# Patient Record
Sex: MALE | Race: WHITE | URBAN - METROPOLITAN AREA
[De-identification: names, ages, dates, MRNs, and addresses within clinical notes are randomized per-mention and may not be internally consistent; named-entity substitution may affect disease eponyms.]

---

## 2017-09-11 ENCOUNTER — OUTPATIENT (OUTPATIENT)
Dept: OUTPATIENT SERVICES | Facility: HOSPITAL | Age: 62
LOS: 1 days | Discharge: HOME | End: 2017-09-11

## 2017-09-11 DIAGNOSIS — R31.9 HEMATURIA, UNSPECIFIED: ICD-10-CM

## 2017-09-18 ENCOUNTER — OUTPATIENT (OUTPATIENT)
Dept: OUTPATIENT SERVICES | Facility: HOSPITAL | Age: 62
LOS: 1 days | Discharge: HOME | End: 2017-09-18

## 2017-09-18 DIAGNOSIS — C61 MALIGNANT NEOPLASM OF PROSTATE: ICD-10-CM

## 2017-11-03 ENCOUNTER — OUTPATIENT (OUTPATIENT)
Dept: OUTPATIENT SERVICES | Facility: HOSPITAL | Age: 62
LOS: 1 days | Discharge: HOME | End: 2017-11-03

## 2017-11-08 DIAGNOSIS — Z12.11 ENCOUNTER FOR SCREENING FOR MALIGNANT NEOPLASM OF COLON: ICD-10-CM

## 2017-11-08 DIAGNOSIS — R19.7 DIARRHEA, UNSPECIFIED: ICD-10-CM

## 2017-11-08 DIAGNOSIS — K21.0 GASTRO-ESOPHAGEAL REFLUX DISEASE WITH ESOPHAGITIS: ICD-10-CM

## 2017-11-08 DIAGNOSIS — B96.81 HELICOBACTER PYLORI [H. PYLORI] AS THE CAUSE OF DISEASES CLASSIFIED ELSEWHERE: ICD-10-CM

## 2017-11-08 DIAGNOSIS — M10.9 GOUT, UNSPECIFIED: ICD-10-CM

## 2017-11-08 DIAGNOSIS — N40.0 BENIGN PROSTATIC HYPERPLASIA WITHOUT LOWER URINARY TRACT SYMPTOMS: ICD-10-CM

## 2017-11-08 DIAGNOSIS — E66.9 OBESITY, UNSPECIFIED: ICD-10-CM

## 2017-11-08 DIAGNOSIS — K31.89 OTHER DISEASES OF STOMACH AND DUODENUM: ICD-10-CM

## 2017-11-08 DIAGNOSIS — K64.8 OTHER HEMORRHOIDS: ICD-10-CM

## 2017-11-08 DIAGNOSIS — K64.4 RESIDUAL HEMORRHOIDAL SKIN TAGS: ICD-10-CM

## 2017-11-08 DIAGNOSIS — K29.80 DUODENITIS WITHOUT BLEEDING: ICD-10-CM

## 2017-11-08 DIAGNOSIS — E78.00 PURE HYPERCHOLESTEROLEMIA, UNSPECIFIED: ICD-10-CM

## 2017-11-08 DIAGNOSIS — K25.4 CHRONIC OR UNSPECIFIED GASTRIC ULCER WITH HEMORRHAGE: ICD-10-CM

## 2018-01-02 PROBLEM — Z00.00 ENCOUNTER FOR PREVENTIVE HEALTH EXAMINATION: Status: ACTIVE | Noted: 2018-01-02

## 2018-01-26 ENCOUNTER — OUTPATIENT (OUTPATIENT)
Dept: OUTPATIENT SERVICES | Facility: HOSPITAL | Age: 63
LOS: 1 days | Discharge: HOME | End: 2018-01-26

## 2018-01-31 DIAGNOSIS — E78.00 PURE HYPERCHOLESTEROLEMIA, UNSPECIFIED: ICD-10-CM

## 2018-01-31 DIAGNOSIS — D13.1 BENIGN NEOPLASM OF STOMACH: ICD-10-CM

## 2018-01-31 DIAGNOSIS — K25.9 GASTRIC ULCER, UNSPECIFIED AS ACUTE OR CHRONIC, WITHOUT HEMORRHAGE OR PERFORATION: ICD-10-CM

## 2018-01-31 DIAGNOSIS — Z88.8 ALLERGY STATUS TO OTHER DRUGS, MEDICAMENTS AND BIOLOGICAL SUBSTANCES STATUS: ICD-10-CM

## 2018-01-31 DIAGNOSIS — K29.50 UNSPECIFIED CHRONIC GASTRITIS WITHOUT BLEEDING: ICD-10-CM

## 2018-01-31 DIAGNOSIS — K31.9 DISEASE OF STOMACH AND DUODENUM, UNSPECIFIED: ICD-10-CM

## 2018-02-02 ENCOUNTER — APPOINTMENT (OUTPATIENT)
Dept: UROLOGY | Facility: CLINIC | Age: 63
End: 2018-02-02
Payer: MEDICAID

## 2018-02-02 ENCOUNTER — TRANSCRIPTION ENCOUNTER (OUTPATIENT)
Age: 63
End: 2018-02-02

## 2018-02-02 VITALS
WEIGHT: 215 LBS | BODY MASS INDEX: 32.58 KG/M2 | SYSTOLIC BLOOD PRESSURE: 140 MMHG | DIASTOLIC BLOOD PRESSURE: 87 MMHG | HEART RATE: 74 BPM | HEIGHT: 68 IN

## 2018-02-02 DIAGNOSIS — Z72.0 TOBACCO USE: ICD-10-CM

## 2018-02-02 DIAGNOSIS — N40.0 BENIGN PROSTATIC HYPERPLASIA WITHOUT LOWER URINARY TRACT SYMPMS: ICD-10-CM

## 2018-02-02 DIAGNOSIS — I10 ESSENTIAL (PRIMARY) HYPERTENSION: ICD-10-CM

## 2018-02-02 DIAGNOSIS — Z82.49 FAMILY HISTORY OF ISCHEMIC HEART DISEASE AND OTHER DISEASES OF THE CIRCULATORY SYSTEM: ICD-10-CM

## 2018-02-02 DIAGNOSIS — Z78.9 OTHER SPECIFIED HEALTH STATUS: ICD-10-CM

## 2018-02-02 DIAGNOSIS — F17.290 NICOTINE DEPENDENCE, OTHER TOBACCO PRODUCT, UNCOMPLICATED: ICD-10-CM

## 2018-02-02 DIAGNOSIS — R79.89 OTHER SPECIFIED ABNORMAL FINDINGS OF BLOOD CHEMISTRY: ICD-10-CM

## 2018-02-02 PROCEDURE — 99203 OFFICE O/P NEW LOW 30 MIN: CPT

## 2018-02-02 RX ORDER — NICOTINE TRANSDERMAL SYSTEM 14 MG/24H
14 PATCH, EXTENDED RELEASE TRANSDERMAL
Qty: 28 | Refills: 0 | Status: ACTIVE | COMMUNITY
Start: 2017-08-02

## 2018-02-02 RX ORDER — OMEPRAZOLE 40 MG/1
40 CAPSULE, DELAYED RELEASE ORAL
Qty: 60 | Refills: 0 | Status: ACTIVE | COMMUNITY
Start: 2017-11-03

## 2018-02-02 RX ORDER — CLARITHROMYCIN 500 MG/1
500 TABLET, FILM COATED ORAL
Qty: 28 | Refills: 0 | Status: ACTIVE | COMMUNITY
Start: 2017-11-14

## 2018-02-02 RX ORDER — BENZONATATE 100 MG/1
100 CAPSULE ORAL
Qty: 40 | Refills: 0 | Status: ACTIVE | COMMUNITY
Start: 2018-01-16

## 2018-02-02 RX ORDER — AMOXICILLIN 500 MG/1
500 TABLET, FILM COATED ORAL
Qty: 56 | Refills: 0 | Status: ACTIVE | COMMUNITY
Start: 2017-11-14

## 2018-02-02 RX ORDER — TADALAFIL 5 MG/1
5 TABLET, FILM COATED ORAL
Qty: 30 | Refills: 3 | Status: ACTIVE | COMMUNITY
Start: 2018-02-02 | End: 1900-01-01

## 2018-02-02 RX ORDER — AMLODIPINE BESYLATE 5 MG/1
5 TABLET ORAL
Qty: 30 | Refills: 0 | Status: ACTIVE | COMMUNITY
Start: 2018-01-02

## 2018-02-02 RX ORDER — TADALAFIL 5 MG/1
5 TABLET, FILM COATED ORAL
Qty: 30 | Refills: 0 | Status: ACTIVE | COMMUNITY
Start: 2017-08-31

## 2018-08-14 ENCOUNTER — APPOINTMENT (OUTPATIENT)
Dept: UROLOGY | Facility: CLINIC | Age: 63
End: 2018-08-14

## 2019-03-07 ENCOUNTER — OUTPATIENT (OUTPATIENT)
Dept: OUTPATIENT SERVICES | Facility: HOSPITAL | Age: 64
LOS: 1 days | Discharge: HOME | End: 2019-03-07

## 2019-03-07 VITALS
TEMPERATURE: 96 F | RESPIRATION RATE: 18 BRPM | SYSTOLIC BLOOD PRESSURE: 140 MMHG | WEIGHT: 220.02 LBS | HEIGHT: 68 IN | DIASTOLIC BLOOD PRESSURE: 90 MMHG | HEART RATE: 84 BPM | OXYGEN SATURATION: 96 %

## 2019-03-07 DIAGNOSIS — J34.2 DEVIATED NASAL SEPTUM: ICD-10-CM

## 2019-03-07 DIAGNOSIS — J34.3 HYPERTROPHY OF NASAL TURBINATES: ICD-10-CM

## 2019-03-07 DIAGNOSIS — J32.0 CHRONIC MAXILLARY SINUSITIS: ICD-10-CM

## 2019-03-07 DIAGNOSIS — Z98.890 OTHER SPECIFIED POSTPROCEDURAL STATES: Chronic | ICD-10-CM

## 2019-03-07 DIAGNOSIS — J32.2 CHRONIC ETHMOIDAL SINUSITIS: ICD-10-CM

## 2019-03-07 DIAGNOSIS — Z01.818 ENCOUNTER FOR OTHER PREPROCEDURAL EXAMINATION: ICD-10-CM

## 2019-03-07 LAB
ALBUMIN SERPL ELPH-MCNC: 5 G/DL — SIGNIFICANT CHANGE UP (ref 3.5–5.2)
ALP SERPL-CCNC: 58 U/L — SIGNIFICANT CHANGE UP (ref 30–115)
ALT FLD-CCNC: 29 U/L — SIGNIFICANT CHANGE UP (ref 0–41)
ANION GAP SERPL CALC-SCNC: 16 MMOL/L — HIGH (ref 7–14)
APTT BLD: 35.8 SEC — SIGNIFICANT CHANGE UP (ref 27–39.2)
AST SERPL-CCNC: 24 U/L — SIGNIFICANT CHANGE UP (ref 0–41)
BASOPHILS # BLD AUTO: 0.07 K/UL — SIGNIFICANT CHANGE UP (ref 0–0.2)
BASOPHILS NFR BLD AUTO: 0.7 % — SIGNIFICANT CHANGE UP (ref 0–1)
BILIRUB SERPL-MCNC: 0.6 MG/DL — SIGNIFICANT CHANGE UP (ref 0.2–1.2)
BUN SERPL-MCNC: 20 MG/DL — SIGNIFICANT CHANGE UP (ref 10–20)
CALCIUM SERPL-MCNC: 10.1 MG/DL — SIGNIFICANT CHANGE UP (ref 8.5–10.1)
CHLORIDE SERPL-SCNC: 100 MMOL/L — SIGNIFICANT CHANGE UP (ref 98–110)
CO2 SERPL-SCNC: 24 MMOL/L — SIGNIFICANT CHANGE UP (ref 17–32)
CREAT SERPL-MCNC: 1.1 MG/DL — SIGNIFICANT CHANGE UP (ref 0.7–1.5)
EOSINOPHIL # BLD AUTO: 0.24 K/UL — SIGNIFICANT CHANGE UP (ref 0–0.7)
EOSINOPHIL NFR BLD AUTO: 2.3 % — SIGNIFICANT CHANGE UP (ref 0–8)
GLUCOSE SERPL-MCNC: 67 MG/DL — LOW (ref 70–99)
HCT VFR BLD CALC: 46.7 % — SIGNIFICANT CHANGE UP (ref 42–52)
HGB BLD-MCNC: 15.5 G/DL — SIGNIFICANT CHANGE UP (ref 14–18)
IMM GRANULOCYTES NFR BLD AUTO: 0.5 % — HIGH (ref 0.1–0.3)
INR BLD: 0.89 RATIO — SIGNIFICANT CHANGE UP (ref 0.65–1.3)
LYMPHOCYTES # BLD AUTO: 2.21 K/UL — SIGNIFICANT CHANGE UP (ref 1.2–3.4)
LYMPHOCYTES # BLD AUTO: 20.9 % — SIGNIFICANT CHANGE UP (ref 20.5–51.1)
MCHC RBC-ENTMCNC: 30 PG — SIGNIFICANT CHANGE UP (ref 27–31)
MCHC RBC-ENTMCNC: 33.2 G/DL — SIGNIFICANT CHANGE UP (ref 32–37)
MCV RBC AUTO: 90.5 FL — SIGNIFICANT CHANGE UP (ref 80–94)
MONOCYTES # BLD AUTO: 0.63 K/UL — HIGH (ref 0.1–0.6)
MONOCYTES NFR BLD AUTO: 6 % — SIGNIFICANT CHANGE UP (ref 1.7–9.3)
NEUTROPHILS # BLD AUTO: 7.36 K/UL — HIGH (ref 1.4–6.5)
NEUTROPHILS NFR BLD AUTO: 69.6 % — SIGNIFICANT CHANGE UP (ref 42.2–75.2)
NRBC # BLD: 0 /100 WBCS — SIGNIFICANT CHANGE UP (ref 0–0)
PLATELET # BLD AUTO: 251 K/UL — SIGNIFICANT CHANGE UP (ref 130–400)
POTASSIUM SERPL-MCNC: 4 MMOL/L — SIGNIFICANT CHANGE UP (ref 3.5–5)
POTASSIUM SERPL-SCNC: 4 MMOL/L — SIGNIFICANT CHANGE UP (ref 3.5–5)
PROT SERPL-MCNC: 7.4 G/DL — SIGNIFICANT CHANGE UP (ref 6–8)
PROTHROM AB SERPL-ACNC: 10.3 SEC — SIGNIFICANT CHANGE UP (ref 9.95–12.87)
RBC # BLD: 5.16 M/UL — SIGNIFICANT CHANGE UP (ref 4.7–6.1)
RBC # FLD: 12.8 % — SIGNIFICANT CHANGE UP (ref 11.5–14.5)
SODIUM SERPL-SCNC: 140 MMOL/L — SIGNIFICANT CHANGE UP (ref 135–146)
WBC # BLD: 10.56 K/UL — SIGNIFICANT CHANGE UP (ref 4.8–10.8)
WBC # FLD AUTO: 10.56 K/UL — SIGNIFICANT CHANGE UP (ref 4.8–10.8)

## 2019-03-07 NOTE — H&P PST ADULT - ATTENDING COMMENTS
Attending Physician: I have personally seen and examined the patient.  I agree with the history and physical which i have reviewed and noted any changes below.    History as above. Patient denies fever/cough.  Informed consent obtained for FUNCTIONAL ENDOSCOPIC SINUS SURGERY, SUBMUCOUS RESECTION NASAL SEPTUM, TURBINECTOMY, BALLOON SINUPLASTY, WITH IMAGE GUIDANCE SYSTEM, POLYPECTOMY. All risks/benefits/alternatives/complications discussed including re-bleed, re-op, pain, headache, possibility of CNS AND OPTIC NERVE injury, recurrence, etc. All questions answered.

## 2019-03-07 NOTE — H&P PST ADULT - PMH
Benign prostatic hyperplasia, unspecified whether lower urinary tract symptoms present    GERD (gastroesophageal reflux disease)    HTN (hypertension)    Hypercholesteremia    Left eye injury, sequela  had nail in eye 17 yrs old-- caused some pupil and caused increased pressure

## 2019-03-21 ENCOUNTER — OUTPATIENT (OUTPATIENT)
Dept: OUTPATIENT SERVICES | Facility: HOSPITAL | Age: 64
LOS: 1 days | Discharge: HOME | End: 2019-03-21

## 2019-03-21 ENCOUNTER — RESULT REVIEW (OUTPATIENT)
Age: 64
End: 2019-03-21

## 2019-03-21 VITALS
DIASTOLIC BLOOD PRESSURE: 82 MMHG | OXYGEN SATURATION: 96 % | HEART RATE: 71 BPM | RESPIRATION RATE: 20 BRPM | TEMPERATURE: 98 F | HEIGHT: 68 IN | WEIGHT: 220.02 LBS | SYSTOLIC BLOOD PRESSURE: 170 MMHG

## 2019-03-21 DIAGNOSIS — J34.2 DEVIATED NASAL SEPTUM: ICD-10-CM

## 2019-03-21 DIAGNOSIS — Z98.890 OTHER SPECIFIED POSTPROCEDURAL STATES: Chronic | ICD-10-CM

## 2019-03-21 DIAGNOSIS — J33.9 NASAL POLYP, UNSPECIFIED: ICD-10-CM

## 2019-03-21 RX ORDER — HYDROMORPHONE HYDROCHLORIDE 2 MG/ML
0.5 INJECTION INTRAMUSCULAR; INTRAVENOUS; SUBCUTANEOUS
Qty: 0 | Refills: 0 | Status: DISCONTINUED | OUTPATIENT
Start: 2019-03-21 | End: 2019-03-21

## 2019-03-21 RX ORDER — DORZOLAMIDE HYDROCHLORIDE TIMOLOL MALEATE 20; 5 MG/ML; MG/ML
1 SOLUTION/ DROPS OPHTHALMIC
Qty: 0 | Refills: 0 | COMMUNITY

## 2019-03-21 RX ORDER — MEPERIDINE HYDROCHLORIDE 50 MG/ML
12.5 INJECTION INTRAMUSCULAR; INTRAVENOUS; SUBCUTANEOUS
Qty: 0 | Refills: 0 | Status: DISCONTINUED | OUTPATIENT
Start: 2019-03-21 | End: 2019-03-21

## 2019-03-21 RX ORDER — TADALAFIL 10 MG/1
1 TABLET, FILM COATED ORAL
Qty: 0 | Refills: 0 | COMMUNITY

## 2019-03-21 RX ORDER — HYDROMORPHONE HYDROCHLORIDE 2 MG/ML
1 INJECTION INTRAMUSCULAR; INTRAVENOUS; SUBCUTANEOUS
Qty: 0 | Refills: 0 | Status: DISCONTINUED | OUTPATIENT
Start: 2019-03-21 | End: 2019-03-21

## 2019-03-21 RX ORDER — OMEPRAZOLE 10 MG/1
1 CAPSULE, DELAYED RELEASE ORAL
Qty: 0 | Refills: 0 | COMMUNITY

## 2019-03-21 RX ORDER — SIMVASTATIN 20 MG/1
1 TABLET, FILM COATED ORAL
Qty: 0 | Refills: 0 | COMMUNITY

## 2019-03-21 RX ORDER — ONDANSETRON 8 MG/1
4 TABLET, FILM COATED ORAL ONCE
Qty: 0 | Refills: 0 | Status: DISCONTINUED | OUTPATIENT
Start: 2019-03-21 | End: 2019-04-05

## 2019-03-21 RX ORDER — AZELASTINE 137 UG/1
2 SPRAY, METERED NASAL
Qty: 0 | Refills: 0 | COMMUNITY

## 2019-03-21 RX ORDER — ICOSAPENT ETHYL 500 MG/1
2 CAPSULE, LIQUID FILLED ORAL
Qty: 0 | Refills: 0 | COMMUNITY

## 2019-03-21 RX ORDER — OXYCODONE AND ACETAMINOPHEN 5; 325 MG/1; MG/1
2 TABLET ORAL ONCE
Qty: 0 | Refills: 0 | Status: DISCONTINUED | OUTPATIENT
Start: 2019-03-21 | End: 2019-03-21

## 2019-03-21 RX ORDER — SODIUM CHLORIDE 9 MG/ML
1000 INJECTION, SOLUTION INTRAVENOUS
Qty: 0 | Refills: 0 | Status: DISCONTINUED | OUTPATIENT
Start: 2019-03-21 | End: 2019-04-05

## 2019-03-21 RX ADMIN — SODIUM CHLORIDE 100 MILLILITER(S): 9 INJECTION, SOLUTION INTRAVENOUS at 17:06

## 2019-03-21 NOTE — BRIEF OPERATIVE NOTE - NSICDXBRIEFPREOP_GEN_ALL_CORE_FT
PRE-OP DIAGNOSIS:  Nasal polyposis 21-Mar-2019 13:07:46  Rebekah Galo  Nasal turbinate hypertrophy 21-Mar-2019 13:07:36  Rebekah Galo  Deviated nasal septum 21-Mar-2019 13:07:15  Rebekah Galo  Chronic sinusitis 21-Mar-2019 13:07:04  Rebekah Galo

## 2019-03-21 NOTE — CHART NOTE - NSCHARTNOTEFT_GEN_A_CORE
PACU ANESTHESIA ADMISSION NOTE      ____ Intubated  TV:______       Rate: ______      FiO2: ______    _x___ Patent Airway    _x___ Full return of protective reflexes    ____ Full recovery from anesthesia / sedation to baseline status    Vitals:  HR 92  /79  RR 15  O2sat. 98%  Temp: 36.3C       Mental Status:  _x___ Awake   __x___ Alert   _____ Drowsy   _____ Sedated    Nausea/Vomiting: ____ Yes, See Post - Op Orders      __x__ No    Pain Scale (0-10): __x___    Treatment: ____ None    __x__ See Post - Op/PCA Orders    Post - Operative Fluids:   ____ Oral   __x__ See Post - Op Orders    Plan:  Discharge to:   _x___Home       _____Floor      _____Critical Care    _____ Other:_________________  x  Comments: s/p general anesthesia with ETT, No anesthesia complications. Pt's condition is stable in PACU. Full report is given to PACU RN.

## 2019-03-21 NOTE — ASU DISCHARGE PLAN (ADULT/PEDIATRIC) - ASU DC SPECIAL INSTRUCTIONSFT
SEE PRE-PRINTED INSTRUCTIONS:    DO NOT remove packing.  Change dressing as needed.  If significant bleeding occurs, call the office or go to the emergency room.  Place ice packs on the face if having pain/bleeding.    Rest for 24 hours.  No bending/straining.  keep head above "heart level" for 1 week.  Avoid activities that may congest the nose, i.e. eating hot or spicy foods.  Drink cold fluids with ice, especially if some bleeding occurs.      No Motrin, Advil, Alleve, or Aspirin after surgery.  Take medications as directed.

## 2019-03-21 NOTE — BRIEF OPERATIVE NOTE - NSICDXBRIEFPROCEDURE_GEN_ALL_CORE_FT
PROCEDURES:  Bilateral endoscopic nasal polypectomy 21-Mar-2019 13:06:20  Rebekah Galo  Bilateral sphenoid balloon sinuplasty 21-Mar-2019 13:04:56  Rebekah Galo  Bilateral maxillary balloon sinuplasty 21-Mar-2019 13:04:28  Rebekah Galo  Balloon sinuplasty of both frontal sinuses 21-Mar-2019 13:03:20  Rebekah Galo  Partial inferior turbinectomy 21-Mar-2019 13:01:42 BILATERAL Rebekah Galo  Submuc nasal sept resect 21-Mar-2019 13:00:44  Rebekah Galo  Endoscopic sinus surgery with intraoperative electromagnetic tracking and image guidance 21-Mar-2019 13:00:16  Rebekah Galo PROCEDURES:  Bilateral sphenoid balloon sinuplasty 21-Mar-2019 13:04:56  Rebekah Galo  Bilateral maxillary balloon sinuplasty 21-Mar-2019 13:04:28  Rebekah Galo  Balloon sinuplasty of both frontal sinuses 21-Mar-2019 13:03:20  Rebekah Galo  Partial inferior turbinectomy 21-Mar-2019 13:01:42 BILATERAL Rebekah Galo  Submuc nasal sept resect 21-Mar-2019 13:00:44  Rebekah Galo  Endoscopic sinus surgery with intraoperative electromagnetic tracking and image guidance 21-Mar-2019 13:00:16  Rebekah Galo

## 2019-03-21 NOTE — PRE-ANESTHESIA EVALUATION ADULT - NSPROPOSEDPROCEDFT_GEN_ALL_CORE
Functional Endoscopic Sinus Surgery Submucous Resection of Nasal Septum Balloon Sinus Dilation Turbinectomy Polypectomy

## 2019-03-22 VITALS — WEIGHT: 220.02 LBS | HEIGHT: 68 IN

## 2019-03-27 LAB — SURGICAL PATHOLOGY STUDY: SIGNIFICANT CHANGE UP

## 2019-03-29 DIAGNOSIS — E78.00 PURE HYPERCHOLESTEROLEMIA, UNSPECIFIED: ICD-10-CM

## 2019-03-29 DIAGNOSIS — F17.210 NICOTINE DEPENDENCE, CIGARETTES, UNCOMPLICATED: ICD-10-CM

## 2019-03-29 DIAGNOSIS — J32.8 OTHER CHRONIC SINUSITIS: ICD-10-CM

## 2019-03-29 DIAGNOSIS — I10 ESSENTIAL (PRIMARY) HYPERTENSION: ICD-10-CM

## 2019-03-29 DIAGNOSIS — N40.0 BENIGN PROSTATIC HYPERPLASIA WITHOUT LOWER URINARY TRACT SYMPTOMS: ICD-10-CM

## 2019-03-29 DIAGNOSIS — Z88.8 ALLERGY STATUS TO OTHER DRUGS, MEDICAMENTS AND BIOLOGICAL SUBSTANCES: ICD-10-CM

## 2019-03-29 DIAGNOSIS — J34.2 DEVIATED NASAL SEPTUM: ICD-10-CM

## 2019-03-29 DIAGNOSIS — J34.3 HYPERTROPHY OF NASAL TURBINATES: ICD-10-CM

## 2019-03-29 DIAGNOSIS — K21.9 GASTRO-ESOPHAGEAL REFLUX DISEASE WITHOUT ESOPHAGITIS: ICD-10-CM

## 2020-03-13 ENCOUNTER — EMERGENCY (EMERGENCY)
Facility: HOSPITAL | Age: 65
LOS: 0 days | Discharge: HOME | End: 2020-03-13
Attending: EMERGENCY MEDICINE | Admitting: EMERGENCY MEDICINE
Payer: MEDICAID

## 2020-03-13 VITALS
RESPIRATION RATE: 16 BRPM | HEART RATE: 83 BPM | DIASTOLIC BLOOD PRESSURE: 74 MMHG | TEMPERATURE: 97 F | OXYGEN SATURATION: 97 % | SYSTOLIC BLOOD PRESSURE: 145 MMHG | WEIGHT: 220.02 LBS

## 2020-03-13 DIAGNOSIS — M79.673 PAIN IN UNSPECIFIED FOOT: ICD-10-CM

## 2020-03-13 DIAGNOSIS — Z79.899 OTHER LONG TERM (CURRENT) DRUG THERAPY: ICD-10-CM
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
## 2020-03-13 DIAGNOSIS — E78.5 HYPERLIPIDEMIA, UNSPECIFIED: ICD-10-CM
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
## 2020-03-13 DIAGNOSIS — I10 ESSENTIAL (PRIMARY) HYPERTENSION: ICD-10-CM
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
## 2020-03-13 DIAGNOSIS — K21.9 GASTRO-ESOPHAGEAL REFLUX DISEASE WITHOUT ESOPHAGITIS: ICD-10-CM

## 2020-03-13 DIAGNOSIS — S91.331A PUNCTURE WOUND WITHOUT FOREIGN BODY, RIGHT FOOT, INITIAL ENCOUNTER: ICD-10-CM

## 2020-03-13 DIAGNOSIS — Z98.890 OTHER SPECIFIED POSTPROCEDURAL STATES: Chronic | ICD-10-CM

## 2020-03-13 DIAGNOSIS — Z88.8 ALLERGY STATUS TO OTHER DRUGS, MEDICAMENTS AND BIOLOGICAL SUBSTANCES STATUS: ICD-10-CM

## 2020-03-13 DIAGNOSIS — Z98.890 OTHER SPECIFIED POSTPROCEDURAL STATES: ICD-10-CM

## 2020-03-13 PROBLEM — N40.0 BENIGN PROSTATIC HYPERPLASIA WITHOUT LOWER URINARY TRACT SYMPTOMS: Chronic | Status: ACTIVE | Noted: 2019-03-07

## 2020-03-13 PROBLEM — S05.92XS: Chronic | Status: ACTIVE | Noted: 2019-03-07

## 2020-03-13 PROBLEM — E78.00 PURE HYPERCHOLESTEROLEMIA, UNSPECIFIED: Chronic | Status: ACTIVE | Noted: 2019-03-07

## 2020-03-13 PROCEDURE — 73630 X-RAY EXAM OF FOOT: CPT | Mod: 26,RT

## 2020-03-13 PROCEDURE — 99284 EMERGENCY DEPT VISIT MOD MDM: CPT

## 2020-03-13 RX ORDER — CEPHALEXIN 500 MG
1 CAPSULE ORAL
Qty: 40 | Refills: 0
Start: 2020-03-13 | End: 2020-03-22

## 2020-03-13 RX ORDER — TETANUS TOXOID, REDUCED DIPHTHERIA TOXOID AND ACELLULAR PERTUSSIS VACCINE, ADSORBED 5; 2.5; 8; 8; 2.5 [IU]/.5ML; [IU]/.5ML; UG/.5ML; UG/.5ML; UG/.5ML
0.5 SUSPENSION INTRAMUSCULAR ONCE
Refills: 0 | Status: COMPLETED | OUTPATIENT
Start: 2020-03-13 | End: 2020-03-13

## 2020-03-13 RX ORDER — CEPHALEXIN 500 MG
500 CAPSULE ORAL ONCE
Refills: 0 | Status: COMPLETED | OUTPATIENT
Start: 2020-03-13 | End: 2020-03-13

## 2020-03-13 RX ORDER — OXYCODONE AND ACETAMINOPHEN 5; 325 MG/1; MG/1
1 TABLET ORAL ONCE
Refills: 0 | Status: DISCONTINUED | OUTPATIENT
Start: 2020-03-13 | End: 2020-03-13

## 2020-03-13 RX ADMIN — OXYCODONE AND ACETAMINOPHEN 1 TABLET(S): 5; 325 TABLET ORAL at 14:34

## 2020-03-13 RX ADMIN — TETANUS TOXOID, REDUCED DIPHTHERIA TOXOID AND ACELLULAR PERTUSSIS VACCINE, ADSORBED 0.5 MILLILITER(S): 5; 2.5; 8; 8; 2.5 SUSPENSION INTRAMUSCULAR at 14:24

## 2020-03-13 RX ADMIN — Medication 500 MILLIGRAM(S): at 14:48

## 2020-03-13 NOTE — ED PROVIDER NOTE - PROGRESS NOTE DETAILS
Discussed results with pt.  All questions were answered and return precautions discussed.  Pt is asx and comfortable at this time.  Unremarkable re-exam.  No further concerns at this time from pt.  Will follow up with PMD.  Pt understands and agrees with tx plan. Discussed results with pt.  All questions were answered and return precautions discussed.  Pt is asx and comfortable at this time.  Unremarkable re-exam.  No further concerns at this time from pt.  Will follow up with PMD.  Pt understands and agrees with tx plan.  strict return precautions discussed.

## 2020-03-13 NOTE — ED PROVIDER NOTE - PHYSICAL EXAMINATION
CONST: Well appearing in NAD  EYES: Sclera and conjunctiva clear.  MS: mild TTP plantar surface R foot overlying puncture wound, no TTP dorsum of foot, pedal pulses 2+ bilaterally  SKIN: + puncture wound plantar surface proximal to 2nd digit, mild surrounding erythema, no discharge, mild swelling.   NEURO: R foot NVI

## 2020-03-13 NOTE — ED PROVIDER NOTE - NSFOLLOWUPINSTRUCTIONS_ED_ALL_ED_FT
PUNCTURE WOUND - AfterCare(R) Instructions(ER/ED)     Puncture Wound    WHAT YOU NEED TO KNOW:    A puncture wound is a hole in the skin made by a sharp, pointed object. The area may be bruised or swollen. You may have bleeding, pain, or trouble moving the affected area.Puncture Wound         DISCHARGE INSTRUCTIONS:    Return to the emergency department if:     You have severe pain.      You have numbness or tingling in the area of your wound.      Your wound starts bleeding and does not stop, even after you apply pressure.    Call your doctor if:     You have new drainage or a bad odor coming from the wound.      You have a fever or chills.      You have increased swelling, redness, or pain.      You have red streaks on your skin coming from your wound.      You have questions or concerns about your condition or care.    Medicines: You may need any of the following:    NSAIDs, such as ibuprofen, help decrease swelling, pain, and fever. This medicine is available with or without a doctor's order. NSAIDs can cause stomach bleeding or kidney problems in certain people. If you take blood thinner medicine, always ask your healthcare provider if NSAIDs are safe for you. Always read the medicine label and follow directions.      Antibiotics help prevent a bacterial infection.       Take your medicine as directed. Contact your healthcare provider if you think your medicine is not helping or if you have side effects. Tell him of her if you are allergic to any medicine. Keep a list of the medicines, vitamins, and herbs you take. Include the amounts, and when and why you take them. Bring the list or the pill bottles to follow-up visits. Carry your medicine list with you in case of an emergency.    Care for your wound as directed: Keep your wound clean and dry. When you are allowed to bathe, carefully wash the wound with soap and water. Dry the area and put on new, clean bandages as directed. Change your bandages when they get wet or dirty.    Rest and elevate the injured area above the level of your heart as often as you can. This will help decrease swelling and pain. Prop your injured area on pillows or blankets to keep it elevated comfortably.     Follow up with your doctor in 2 to 3 days: Write down your questions so you remember to ask them during your visits.       © Copyright [employer] 2020       back to top                      © Copyright [employer] 2020       Follow up with your primary medical doctor in 1-2 days

## 2020-03-13 NOTE — ED PROVIDER NOTE - NS ED ROS FT
CONST: No fever, chills or bodyaches  MS: + R foot pain. No joint pain, back pain  SKIN: + puncture wound  NEURO: No headache, dizziness, paresthesia

## 2020-03-13 NOTE — ED PROVIDER NOTE - PATIENT PORTAL LINK FT
You can access the FollowMyHealth Patient Portal offered by [facility] by registering at the following website: [email or web address]. By joining [employer]’s FollowMyHealth portal, you will also be able to view your health information using other applications (apps) compatible with our system.

## 2020-03-13 NOTE — ED PROVIDER NOTE - OBJECTIVE STATEMENT
64 y.o male w/ hx of HTN, HLD presents to the ED for evaluation of R foot pain x 1 day.  last night stepped on nail that went through slipper last night.  Acute pain, sharp, worse w/ ambulation, alleviated w/ rest, mild severity, no radiation of pain.  today pain worse w/ mild erythema and swelling prompting visit to the ED.  denies fever, chills, paresthesias.

## 2020-04-01 ENCOUNTER — OUTPATIENT (OUTPATIENT)
Dept: OUTPATIENT SERVICES | Facility: HOSPITAL | Age: 65
LOS: 1 days | End: 2020-04-01
Payer: MEDICAID

## 2020-04-01 DIAGNOSIS — Z98.890 OTHER SPECIFIED POSTPROCEDURAL STATES: Chronic | ICD-10-CM

## 2020-04-01 PROCEDURE — G9001: CPT

## 2020-04-30 DIAGNOSIS — Z71.89 OTHER SPECIFIED COUNSELING: ICD-10-CM

## 2020-07-13 ENCOUNTER — EMERGENCY (EMERGENCY)
Facility: HOSPITAL | Age: 65
LOS: 0 days | Discharge: HOME | End: 2020-07-13
Attending: EMERGENCY MEDICINE | Admitting: EMERGENCY MEDICINE
Payer: MEDICAID

## 2020-07-13 VITALS
DIASTOLIC BLOOD PRESSURE: 85 MMHG | HEART RATE: 87 BPM | WEIGHT: 229.94 LBS | RESPIRATION RATE: 18 BRPM | HEIGHT: 68 IN | SYSTOLIC BLOOD PRESSURE: 166 MMHG | OXYGEN SATURATION: 99 % | TEMPERATURE: 98 F

## 2020-07-13 VITALS
OXYGEN SATURATION: 99 % | SYSTOLIC BLOOD PRESSURE: 180 MMHG | RESPIRATION RATE: 18 BRPM | DIASTOLIC BLOOD PRESSURE: 80 MMHG | TEMPERATURE: 97 F | HEART RATE: 80 BPM

## 2020-07-13 DIAGNOSIS — M54.5 LOW BACK PAIN: ICD-10-CM

## 2020-07-13 DIAGNOSIS — Z98.890 OTHER SPECIFIED POSTPROCEDURAL STATES: Chronic | ICD-10-CM

## 2020-07-13 DIAGNOSIS — I10 ESSENTIAL (PRIMARY) HYPERTENSION: ICD-10-CM

## 2020-07-13 DIAGNOSIS — Z88.8 ALLERGY STATUS TO OTHER DRUGS, MEDICAMENTS AND BIOLOGICAL SUBSTANCES: ICD-10-CM
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
## 2020-07-13 DIAGNOSIS — Z79.899 OTHER LONG TERM (CURRENT) DRUG THERAPY: ICD-10-CM

## 2020-07-13 DIAGNOSIS — R10.9 UNSPECIFIED ABDOMINAL PAIN: ICD-10-CM
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
## 2020-07-13 DIAGNOSIS — Z79.01 LONG TERM (CURRENT) USE OF ANTICOAGULANTS: ICD-10-CM

## 2020-07-13 LAB
ALBUMIN SERPL ELPH-MCNC: 4.5 G/DL — SIGNIFICANT CHANGE UP (ref 3.5–5.2)
ALP SERPL-CCNC: 57 U/L — SIGNIFICANT CHANGE UP (ref 30–115)
ALT FLD-CCNC: 23 U/L — SIGNIFICANT CHANGE UP (ref 0–41)
ANION GAP SERPL CALC-SCNC: 9 MMOL/L — SIGNIFICANT CHANGE UP (ref 7–14)
APPEARANCE UR: CLEAR — SIGNIFICANT CHANGE UP
AST SERPL-CCNC: 22 U/L — SIGNIFICANT CHANGE UP (ref 0–41)
BASOPHILS # BLD AUTO: 0.07 K/UL — SIGNIFICANT CHANGE UP (ref 0–0.2)
BASOPHILS NFR BLD AUTO: 0.7 % — SIGNIFICANT CHANGE UP (ref 0–1)
BILIRUB DIRECT SERPL-MCNC: <0.2 MG/DL — SIGNIFICANT CHANGE UP (ref 0–0.2)
BILIRUB INDIRECT FLD-MCNC: >0.2 MG/DL — SIGNIFICANT CHANGE UP (ref 0.2–1.2)
BILIRUB SERPL-MCNC: 0.4 MG/DL — SIGNIFICANT CHANGE UP (ref 0.2–1.2)
BILIRUB UR-MCNC: NEGATIVE — SIGNIFICANT CHANGE UP
BUN SERPL-MCNC: 19 MG/DL — SIGNIFICANT CHANGE UP (ref 10–20)
CALCIUM SERPL-MCNC: 9.5 MG/DL — SIGNIFICANT CHANGE UP (ref 8.5–10.1)
CHLORIDE SERPL-SCNC: 102 MMOL/L — SIGNIFICANT CHANGE UP (ref 98–110)
CO2 SERPL-SCNC: 25 MMOL/L — SIGNIFICANT CHANGE UP (ref 17–32)
COLOR SPEC: YELLOW — SIGNIFICANT CHANGE UP
CREAT SERPL-MCNC: 1.1 MG/DL — SIGNIFICANT CHANGE UP (ref 0.7–1.5)
DIFF PNL FLD: NEGATIVE — SIGNIFICANT CHANGE UP
EOSINOPHIL # BLD AUTO: 0.33 K/UL — SIGNIFICANT CHANGE UP (ref 0–0.7)
EOSINOPHIL NFR BLD AUTO: 3.3 % — SIGNIFICANT CHANGE UP (ref 0–8)
GLUCOSE SERPL-MCNC: 117 MG/DL — HIGH (ref 70–99)
GLUCOSE UR QL: NEGATIVE MG/DL — SIGNIFICANT CHANGE UP
HCT VFR BLD CALC: 43.6 % — SIGNIFICANT CHANGE UP (ref 42–52)
HGB BLD-MCNC: 14.7 G/DL — SIGNIFICANT CHANGE UP (ref 14–18)
IMM GRANULOCYTES NFR BLD AUTO: 0.6 % — HIGH (ref 0.1–0.3)
KETONES UR-MCNC: NEGATIVE — SIGNIFICANT CHANGE UP
LACTATE SERPL-SCNC: 1.1 MMOL/L — SIGNIFICANT CHANGE UP (ref 0.7–2)
LEUKOCYTE ESTERASE UR-ACNC: NEGATIVE — SIGNIFICANT CHANGE UP
LIDOCAIN IGE QN: 99 U/L — HIGH (ref 7–60)
LYMPHOCYTES # BLD AUTO: 2.36 K/UL — SIGNIFICANT CHANGE UP (ref 1.2–3.4)
LYMPHOCYTES # BLD AUTO: 23.6 % — SIGNIFICANT CHANGE UP (ref 20.5–51.1)
MCHC RBC-ENTMCNC: 30.9 PG — SIGNIFICANT CHANGE UP (ref 27–31)
MCHC RBC-ENTMCNC: 33.7 G/DL — SIGNIFICANT CHANGE UP (ref 32–37)
MCV RBC AUTO: 91.8 FL — SIGNIFICANT CHANGE UP (ref 80–94)
MONOCYTES # BLD AUTO: 0.62 K/UL — HIGH (ref 0.1–0.6)
MONOCYTES NFR BLD AUTO: 6.2 % — SIGNIFICANT CHANGE UP (ref 1.7–9.3)
NEUTROPHILS # BLD AUTO: 6.57 K/UL — HIGH (ref 1.4–6.5)
NEUTROPHILS NFR BLD AUTO: 65.6 % — SIGNIFICANT CHANGE UP (ref 42.2–75.2)
NITRITE UR-MCNC: NEGATIVE — SIGNIFICANT CHANGE UP
NRBC # BLD: 0 /100 WBCS — SIGNIFICANT CHANGE UP (ref 0–0)
PH UR: 6 — SIGNIFICANT CHANGE UP (ref 5–8)
PLATELET # BLD AUTO: 223 K/UL — SIGNIFICANT CHANGE UP (ref 130–400)
POTASSIUM SERPL-MCNC: 3.9 MMOL/L — SIGNIFICANT CHANGE UP (ref 3.5–5)
POTASSIUM SERPL-SCNC: 3.9 MMOL/L — SIGNIFICANT CHANGE UP (ref 3.5–5)
PROT SERPL-MCNC: 6.9 G/DL — SIGNIFICANT CHANGE UP (ref 6–8)
PROT UR-MCNC: NEGATIVE MG/DL — SIGNIFICANT CHANGE UP
RBC # BLD: 4.75 M/UL — SIGNIFICANT CHANGE UP (ref 4.7–6.1)
RBC # FLD: 12.5 % — SIGNIFICANT CHANGE UP (ref 11.5–14.5)
SODIUM SERPL-SCNC: 136 MMOL/L — SIGNIFICANT CHANGE UP (ref 135–146)
SP GR SPEC: 1.02 — SIGNIFICANT CHANGE UP (ref 1.01–1.03)
UROBILINOGEN FLD QL: 0.2 MG/DL — SIGNIFICANT CHANGE UP (ref 0.2–0.2)
WBC # BLD: 10.01 K/UL — SIGNIFICANT CHANGE UP (ref 4.8–10.8)
WBC # FLD AUTO: 10.01 K/UL — SIGNIFICANT CHANGE UP (ref 4.8–10.8)

## 2020-07-13 PROCEDURE — 74177 CT ABD & PELVIS W/CONTRAST: CPT | Mod: 26

## 2020-07-13 PROCEDURE — 99285 EMERGENCY DEPT VISIT HI MDM: CPT

## 2020-07-13 RX ORDER — METHOCARBAMOL 500 MG/1
1 TABLET, FILM COATED ORAL
Qty: 12 | Refills: 0
Start: 2020-07-13 | End: 2020-07-16

## 2020-07-13 RX ORDER — SODIUM CHLORIDE 9 MG/ML
1000 INJECTION, SOLUTION INTRAVENOUS ONCE
Refills: 0 | Status: COMPLETED | OUTPATIENT
Start: 2020-07-13 | End: 2020-07-13

## 2020-07-13 RX ORDER — KETOROLAC TROMETHAMINE 30 MG/ML
15 SYRINGE (ML) INJECTION ONCE
Refills: 0 | Status: DISCONTINUED | OUTPATIENT
Start: 2020-07-13 | End: 2020-07-13

## 2020-07-13 RX ADMIN — Medication 15 MILLIGRAM(S): at 18:25

## 2020-07-13 RX ADMIN — SODIUM CHLORIDE 1000 MILLILITER(S): 9 INJECTION, SOLUTION INTRAVENOUS at 18:25

## 2020-07-13 NOTE — ED PROVIDER NOTE - PHYSICAL EXAMINATION
Physical Exam    Vital Signs: I have reviewed the initial vital signs.  Constitutional: well-nourished, appears stated age, no acute distress  Eyes: Conjunctiva pink, Sclera clear.  Cardiovascular: S1 and S2, regular rate, regular rhythm, well-perfused extremities, radial pulses equal and 2+  Respiratory: unlabored respiratory effort, clear to auscultation bilaterally no wheezing, rales and rhonchi  Gastrointestinal: soft, non-tender abdomen, no pulsatile mass, normal bowl sounds  Musculoskeletal: supple neck, no lower extremity edema, no midline tenderness, left cva ttp.   Integumentary: warm, dry, no rash  Neurologic: awake, alert, nvi

## 2020-07-13 NOTE — ED PROVIDER NOTE - NS ED ROS FT
Constitutional: (-) fever, (-) chills  Eyes: (-) visual changes  ENT: (-) nasal congestions  Cardiovascular: (-) chest pain, (-) syncope  Respiratory: (-) cough, (-) shortness of breath, (-) dyspnea,   Gastrointestinal: (-) vomiting, (-) diarrhea, (-)nausea,  Musculoskeletal: (-) neck pain, (-) back pain, (-) joint pain, (+) flank pain  Integumentary: (-) rash, (-) edema, (-) bruises  Neurological: (-) headache, (-) loc, (-) dizziness, (-) tingling, (-)numbness,  Peripheral Vascular: (-) leg swelling  :  (-)dysuria,  (-) hematuria  Allergic/Immunologic: (-) pruritus

## 2020-07-13 NOTE — ED PROVIDER NOTE - PATIENT PORTAL LINK FT
You can access the FollowMyHealth Patient Portal offered by [facility] by registering at the following website: [email or web address]. By joining [employer]’s FollowMyHealth portal, you will also be able to view your health information using other applications (apps) compatible with our system.

## 2020-07-13 NOTE — ED PROVIDER NOTE - NSFOLLOWUPCLINICS_GEN_ALL_ED_FT
[facility] ([facility])  Rehabilitation  [address] [place] [address]  Phone: [phone]  Fax:   Follow Up Time: 1-3 Days

## 2020-07-13 NOTE — ED PROVIDER NOTE - CLINICAL SUMMARY MEDICAL DECISION MAKING FREE TEXT BOX
64 yo male, PMH of kidney stone, HTN, BPH, comes in c/o left lower back pain which started 4 days ago after he lifted a heavy box. Pain is better at rest, worse with movement. No fever/chills, CP/SOB, abdominal pain. No urinary symptoms or hematuria. No numbness/tingling in LE. No difficulty ambulating. Pain is non-radiating. On exam, pt in NAD, AAOx3, head NC/AT, CN II-XII intact, lungs CTA B/L, CV S1S2 regular, abdomen soft/NT/ND/(+)BS, back (-) midline tenderness, (+) TTP over left lower back, ext (-) edema, (-) saddle anesthesia, motor 5/5x4, sensation intact, ambulating with steady gait. Labs/UA/CT reviewed. Will d/c with PMD follow up.

## 2020-07-13 NOTE — ED PROVIDER NOTE - OBJECTIVE STATEMENT
64 yo male, pmh of kid stones, htn, bph, presents to ed for left flank pain, started four days ago, mild, aching, no radiation. denies fever, chills, cp, sob, le swelling, hematuria, dysuria, testicular pain.

## 2020-12-25 NOTE — PRE-ANESTHESIA EVALUATION ADULT - NSATTENDATTESTRD_GEN_ALL_CORE
128
The patient has been re-examined and I agree with the above assessment or I updated with my findings.
The patient has been re-examined and I agree with the above assessment or I updated with my findings.

## 2021-03-29 ENCOUNTER — EMERGENCY (EMERGENCY)
Facility: HOSPITAL | Age: 66
LOS: 0 days | Discharge: HOME | End: 2021-03-29
Attending: EMERGENCY MEDICINE | Admitting: EMERGENCY MEDICINE
Payer: MEDICAID

## 2021-03-29 VITALS
RESPIRATION RATE: 18 BRPM | DIASTOLIC BLOOD PRESSURE: 95 MMHG | HEART RATE: 71 BPM | SYSTOLIC BLOOD PRESSURE: 175 MMHG | TEMPERATURE: 97 F | OXYGEN SATURATION: 96 % | HEIGHT: 68 IN

## 2021-03-29 DIAGNOSIS — Z98.890 OTHER SPECIFIED POSTPROCEDURAL STATES: Chronic | ICD-10-CM

## 2021-03-29 DIAGNOSIS — M25.521 PAIN IN RIGHT ELBOW: ICD-10-CM

## 2021-03-29 DIAGNOSIS — K21.9 GASTRO-ESOPHAGEAL REFLUX DISEASE WITHOUT ESOPHAGITIS: ICD-10-CM

## 2021-03-29 DIAGNOSIS — M25.522 PAIN IN LEFT ELBOW: ICD-10-CM

## 2021-03-29 DIAGNOSIS — M25.561 PAIN IN RIGHT KNEE: ICD-10-CM

## 2021-03-29 DIAGNOSIS — M79.644 PAIN IN RIGHT FINGER(S): ICD-10-CM

## 2021-03-29 DIAGNOSIS — Z79.899 OTHER LONG TERM (CURRENT) DRUG THERAPY: ICD-10-CM

## 2021-03-29 DIAGNOSIS — M25.562 PAIN IN LEFT KNEE: ICD-10-CM

## 2021-03-29 DIAGNOSIS — Z87.891 PERSONAL HISTORY OF NICOTINE DEPENDENCE: ICD-10-CM

## 2021-03-29 DIAGNOSIS — N40.0 BENIGN PROSTATIC HYPERPLASIA WITHOUT LOWER URINARY TRACT SYMPTOMS: ICD-10-CM

## 2021-03-29 DIAGNOSIS — E78.00 PURE HYPERCHOLESTEROLEMIA, UNSPECIFIED: ICD-10-CM

## 2021-03-29 DIAGNOSIS — Z88.8 ALLERGY STATUS TO OTHER DRUGS, MEDICAMENTS AND BIOLOGICAL SUBSTANCES: ICD-10-CM

## 2021-03-29 DIAGNOSIS — I10 ESSENTIAL (PRIMARY) HYPERTENSION: ICD-10-CM

## 2021-03-29 DIAGNOSIS — M25.50 PAIN IN UNSPECIFIED JOINT: ICD-10-CM

## 2021-03-29 PROCEDURE — 99284 EMERGENCY DEPT VISIT MOD MDM: CPT

## 2021-03-29 NOTE — ED PROVIDER NOTE - PATIENT PORTAL LINK FT
You can access the FollowMyHealth Patient Portal offered by [facility] by registering at the following website: [email or web address]. By joining [employer]’s FollowMyHealth portal, you will also be able to view your health information using other applications (apps) compatible with our system.

## 2021-03-29 NOTE — ED PROVIDER NOTE - NS ED ROS FT
Constitutional: (-) fever (-) chills   Eyes/ENT: (-) blurry vision, (-) epistaxis  Cardiovascular: (-) chest pain, (-) syncope  Respiratory: (-) cough, (-) shortness of breath  Gastrointestinal: (-) vomiting, (-) diarrhea  Musculoskeletal: (-) neck pain, (-) back pain, (+) joint pain (+) joint swelling   Integumentary: (-) rash, (-) edema (-)redness   Neurological: (-) headache, (-) altered mental status  Psychiatric: (-) hallucinations  Allergic/Immunologic: (-) pruritus

## 2021-03-29 NOTE — ED PROVIDER NOTE - ATTENDING CONTRIBUTION TO CARE
knees FROM.  no deformity.  no laxity.  bedside sono performed: trace effusion on right.  may be reaction to vaccine.  doubt gout.

## 2021-03-29 NOTE — ED PROVIDER NOTE - CARE PROVIDER_API CALL
Fawad Danielle)  Orthopaedic Surgery  [address] [place] [address]  Phone: [phone]  Fax: [phone]  Follow Up Time:

## 2021-03-29 NOTE — ED ADULT NURSE NOTE - CHIEF COMPLAINT QUOTE
As per patient, "I got the vaccine on 3/10 and I've had joint pains since. Both my knees and fingers and elbows hurt, worse in the last 2 days. I have gout and I took colchicine this morning and I took 5-6 ibuprofen an hour ago"

## 2021-03-29 NOTE — ED PROVIDER NOTE - NSFOLLOWUPINSTRUCTIONS_ED_ALL_ED_FT
Make an appointment with the orthopedist within 1-3 days of your ER visit. Return to the Er if you develop fever ,redness and swelling of your knee or right hand    Knee Pain    WHAT YOU NEED TO KNOW:    What do I need to know about knee pain? Knee pain may start suddenly, or it may be a long-term problem. You may have pain on the side, front, or back of your knee. You may have knee stiffness and swelling. You may hear popping sounds or feel like your knee is giving way or locking up as you walk. You may feel pain when you sit, stand, walk, or climb up and down stairs.    What increases my risk for knee pain?     Obesity      A strain or tear in ligaments or tendons      A leg fracture or knee dislocation      Overuse of your knee      Osteoarthritis, rheumatoid arthritis, or gout      An infection, tumor, or cyst in your knee      Shoes that are not supportive, or training on a hard surface      Sports that involve jumping or pivoting on your knee    How is the cause of knee pain diagnosed? Your healthcare provider will examine your knee and ask about your symptoms. Tell your provider when the pain started and what you were doing at the time. Describe the pain, such as sharp, throbbing, or achy. Tell your provider about any knee injury or surgery you had. You may need any of the following:     MRI, CT, or ultrasound pictures may show an injury, fracture, or tumor.       Blood tests may be used to check the level of inflammation in your blood. The tests may also show signs of infection.      Arthroscopy is a procedure to look inside your knee joint with an arthroscope. An arthroscope is a flexible tube with a light and camera on the end. A knee arthroscopy is usually done to check for disease or damage inside your knee. These problems may be fixed during the procedure.    How is knee pain treated? Treatment will depend on the cause of your pain. You may need any of the following:     NSAIDs help decrease swelling and pain or fever. This medicine is available with or without a doctor's order. NSAIDs can cause stomach bleeding or kidney problems in certain people. If you take blood thinner medicine, always ask your healthcare provider if NSAIDs are safe for you. Always read the medicine label and follow directions.      Acetaminophen decreases pain and fever. It is available without a doctor's order. Ask how much to take and how often to take it. Follow directions. Read the labels of all other medicines you are using to see if they also contain acetaminophen, or ask your doctor or pharmacist. Acetaminophen can cause liver damage if not taken correctly. Do not use more than 4 grams (4,000 milligrams) total of acetaminophen in one day.       Prescription pain medicine may be given. Ask your healthcare provider how to take this medicine safely. Some prescription pain medicines contain acetaminophen. Do not take other medicines that contain acetaminophen without talking to your healthcare provider. Too much acetaminophen may cause liver damage. Prescription pain medicine may cause constipation. Ask your healthcare provider how to prevent or treat constipation.       Steroid injections may be given into your knee. Steroids reduce inflammation and pain.      Surgery may be used for some injuries, such as to repair a torn ACL.    What can I do to manage my symptoms?     Rest your knee so it can heal. Limit activities that increase your pain. Do low-impact exercises, such as walking or swimming.       Apply ice to help reduce swelling and pain. Use an ice pack, or put crushed ice in a plastic bag. Cover it with a towel before you apply it to your knee. Apply ice for 15 to 20 minutes every hour, or as directed.      Apply compression to help reduce swelling. Use a brace or bandage only as directed.      Elevate your knee to help decrease pain and swelling. Elevate your knee while you are sitting or lying down. Prop your leg on pillows to keep your knee above the level of your heart.      Prevent your knee from moving as directed. Your healthcare provider may put on a cast or splint. You may need to wear a leg brace to stabilize your knee. A leg brace can be adjusted to increase your range of motion as your knee heals.Hinged Knee Braces          What can I do to prevent knee pain?     Maintain a healthy weight. Extra weight increases your risk for knee pain. Ask your healthcare provider how much you should weigh. He or she can help you create a safe weight loss plan if you need to lose weight.      Exercise or train properly. Use the correct equipment for sports. Wear shoes that provide good support. Check your posture often as you exercise, play sports, or train for an event. This can help prevent stress and strain on your knees. Rest between sessions so you do not overwork your knees.    When should I seek immediate care?     Your pain is worse, even after treatment.       You cannot bend or straighten your leg completely.       The swelling around your knee does not go down even with treatment.      Your knee is painful and hot to the touch.     When should I contact my healthcare provider?     You have questions or concerns about your condition or care.         CARE AGREEMENT:    You have the right to help plan your care. Learn about your health condition and how it may be treated. Discuss treatment options with your healthcare providers to decide what care you want to receive. You always have the right to refuse treatment.

## 2021-03-29 NOTE — ED PROVIDER NOTE - PHYSICAL EXAMINATION
Physical Exam    Vital Signs: I have reviewed the initial vital signs.  Constitutional: well-nourished, appears stated age, no acute distress  Eyes: Conjunctiva pink, Sclera clear, PERRLA, EOMI.  Cardiovascular: S1 and S2, regular rate, regular rhythm, well-perfused extremities, radial pulses equal and 2+, calves nonedematous non TTP  Respiratory: unlabored respiratory effort  Musculoskeletal: supple neck, no lower extremity edema, no midline tenderness, slight right sided infrapatellar edema noted, no gross bony deformities, full nonpainful active/passive rom b/l. no redness or gross bony  deformities to all joints. Full ROM of upper and lower extremities.   Integumentary: warm, dry, no rashes, no redness, no bruising, no petechiae  Neurologic: awake, alert, cranial nerves II-XII grossly intact, extremities’ motor and sensory functions grossly intact, nonataxic gait

## 2021-03-29 NOTE — ED PROVIDER NOTE - PROGRESS NOTE DETAILS
POCUS showed trace effusion to R knee. Knee ace wrapped for comfort. Pt understands to followup with orthopedics outpt. Return precautions given.

## 2021-03-29 NOTE — ED PROVIDER NOTE - OBJECTIVE STATEMENT
65 y.o. male pmh HTN, GERD, GOUT, HLD presenting for joint pains 3 days duration. Notes some swelling of knees, no redness or decreased ROM, no falls no known trauma. Does not feel like previous gout exacerbations. Has about 2 exacerbations a year. Has taken tylenol and motrin for pain. No difficulty walking. Localized pain to knees b/l, right thumb and elbows b/l. Reports have received 1st moderna vaccination recently before onset of symptoms. Pain is intermittent, nonradiating and aching in nature.

## 2021-03-30 NOTE — ED PROCEDURE NOTE - ATTENDING CONTRIBUTION TO CARE
I was present for and supervised the key/critical aspects of the procedures performed during the care of the patient.
Procedure done by PA.

## 2021-04-04 ENCOUNTER — OUTPATIENT (OUTPATIENT)
Dept: OUTPATIENT SERVICES | Facility: HOSPITAL | Age: 66
LOS: 1 days | Discharge: HOME | End: 2021-04-04

## 2021-04-04 DIAGNOSIS — Z98.890 OTHER SPECIFIED POSTPROCEDURAL STATES: Chronic | ICD-10-CM

## 2021-04-04 DIAGNOSIS — Z11.59 ENCOUNTER FOR SCREENING FOR OTHER VIRAL DISEASES: ICD-10-CM

## 2021-04-07 ENCOUNTER — OUTPATIENT (OUTPATIENT)
Dept: OUTPATIENT SERVICES | Facility: HOSPITAL | Age: 66
LOS: 1 days | Discharge: HOME | End: 2021-04-07
Payer: MEDICAID

## 2021-04-07 DIAGNOSIS — Z98.890 OTHER SPECIFIED POSTPROCEDURAL STATES: Chronic | ICD-10-CM

## 2021-04-07 DIAGNOSIS — R97.20 ELEVATED PROSTATE SPECIFIC ANTIGEN [PSA]: ICD-10-CM

## 2021-04-07 PROCEDURE — 72197 MRI PELVIS W/O & W/DYE: CPT | Mod: 26

## 2021-07-28 NOTE — ED ADULT TRIAGE NOTE - BP NONINVASIVE DIASTOLIC (MM HG)
Detail Level: Detailed Sunscreen Recommendations: Zinc based SPF Detail Level: Zone Detail Level: Generalized Moisturizer Recommendations: Vanicream HA Cleanser Recommendations: Vanicream bar 85

## 2022-06-13 NOTE — ED PROVIDER NOTE - NS ED MD DISPO DISCHARGE
Call from Husam at [facility] to let you know the patient did not show up for his appointment with their office  
Please call patient and advise him that he missed his appointment for consult for his colonoscopy and he needs to call and reschedule because it is important that he gets a colonoscopy especially with his family history of colon cancer.  
Pt was contacted by phone to notify that he missed his appointment for a colonoscopy consult at [facility] and that he needs to contact their office to reschedule the consult. Pt said he thought the appointment was this week and that he will call them to reschedule the consult.  
Home

## 2022-06-20 ENCOUNTER — INPATIENT (INPATIENT)
Facility: HOSPITAL | Age: 67
LOS: 2 days | Discharge: HOME | End: 2022-06-23
Attending: INTERNAL MEDICINE | Admitting: INTERNAL MEDICINE
Payer: MEDICARE

## 2022-06-20 VITALS
WEIGHT: 225.09 LBS | OXYGEN SATURATION: 99 % | DIASTOLIC BLOOD PRESSURE: 98 MMHG | SYSTOLIC BLOOD PRESSURE: 208 MMHG | HEIGHT: 68 IN | RESPIRATION RATE: 20 BRPM | HEART RATE: 47 BPM | TEMPERATURE: 96 F

## 2022-06-20 DIAGNOSIS — Z98.890 OTHER SPECIFIED POSTPROCEDURAL STATES: Chronic | ICD-10-CM

## 2022-06-20 LAB
ALBUMIN SERPL ELPH-MCNC: 5.3 G/DL — HIGH (ref 3.5–5.2)
ALP SERPL-CCNC: 46 U/L — SIGNIFICANT CHANGE UP (ref 30–115)
ALT FLD-CCNC: 30 U/L — SIGNIFICANT CHANGE UP (ref 0–41)
ANION GAP SERPL CALC-SCNC: 11 MMOL/L — SIGNIFICANT CHANGE UP (ref 7–14)
APTT BLD: 33.6 SEC — SIGNIFICANT CHANGE UP (ref 27–39.2)
AST SERPL-CCNC: 23 U/L — SIGNIFICANT CHANGE UP (ref 0–41)
BASOPHILS # BLD AUTO: 0.05 K/UL — SIGNIFICANT CHANGE UP (ref 0–0.2)
BASOPHILS NFR BLD AUTO: 0.5 % — SIGNIFICANT CHANGE UP (ref 0–1)
BILIRUB SERPL-MCNC: 0.4 MG/DL — SIGNIFICANT CHANGE UP (ref 0.2–1.2)
BLD GP AB SCN SERPL QL: SIGNIFICANT CHANGE UP
BUN SERPL-MCNC: 29 MG/DL — HIGH (ref 10–20)
CALCIUM SERPL-MCNC: 9.7 MG/DL — SIGNIFICANT CHANGE UP (ref 8.5–10.1)
CHLORIDE SERPL-SCNC: 104 MMOL/L — SIGNIFICANT CHANGE UP (ref 98–110)
CO2 SERPL-SCNC: 24 MMOL/L — SIGNIFICANT CHANGE UP (ref 17–32)
CREAT SERPL-MCNC: 1.2 MG/DL — SIGNIFICANT CHANGE UP (ref 0.7–1.5)
EGFR: 67 ML/MIN/1.73M2 — SIGNIFICANT CHANGE UP
EOSINOPHIL # BLD AUTO: 0.07 K/UL — SIGNIFICANT CHANGE UP (ref 0–0.7)
EOSINOPHIL NFR BLD AUTO: 0.7 % — SIGNIFICANT CHANGE UP (ref 0–8)
GLUCOSE SERPL-MCNC: 122 MG/DL — HIGH (ref 70–99)
HCT VFR BLD CALC: 45.6 % — SIGNIFICANT CHANGE UP (ref 42–52)
HGB BLD-MCNC: 14.9 G/DL — SIGNIFICANT CHANGE UP (ref 14–18)
IMM GRANULOCYTES NFR BLD AUTO: 0.7 % — HIGH (ref 0.1–0.3)
INR BLD: 1.01 RATIO — SIGNIFICANT CHANGE UP (ref 0.65–1.3)
LYMPHOCYTES # BLD AUTO: 1.25 K/UL — SIGNIFICANT CHANGE UP (ref 1.2–3.4)
LYMPHOCYTES # BLD AUTO: 12.9 % — LOW (ref 20.5–51.1)
MCHC RBC-ENTMCNC: 29.4 PG — SIGNIFICANT CHANGE UP (ref 27–31)
MCHC RBC-ENTMCNC: 32.7 G/DL — SIGNIFICANT CHANGE UP (ref 32–37)
MCV RBC AUTO: 90.1 FL — SIGNIFICANT CHANGE UP (ref 80–94)
MONOCYTES # BLD AUTO: 0.49 K/UL — SIGNIFICANT CHANGE UP (ref 0.1–0.6)
MONOCYTES NFR BLD AUTO: 5 % — SIGNIFICANT CHANGE UP (ref 1.7–9.3)
NEUTROPHILS # BLD AUTO: 7.79 K/UL — HIGH (ref 1.4–6.5)
NEUTROPHILS NFR BLD AUTO: 80.2 % — HIGH (ref 42.2–75.2)
NRBC # BLD: 0 /100 WBCS — SIGNIFICANT CHANGE UP (ref 0–0)
PLATELET # BLD AUTO: 228 K/UL — SIGNIFICANT CHANGE UP (ref 130–400)
POTASSIUM SERPL-MCNC: 4.7 MMOL/L — SIGNIFICANT CHANGE UP (ref 3.5–5)
POTASSIUM SERPL-SCNC: 4.7 MMOL/L — SIGNIFICANT CHANGE UP (ref 3.5–5)
PROT SERPL-MCNC: 7.4 G/DL — SIGNIFICANT CHANGE UP (ref 6–8)
PROTHROM AB SERPL-ACNC: 11.6 SEC — SIGNIFICANT CHANGE UP (ref 9.95–12.87)
RBC # BLD: 5.06 M/UL — SIGNIFICANT CHANGE UP (ref 4.7–6.1)
RBC # FLD: 13.2 % — SIGNIFICANT CHANGE UP (ref 11.5–14.5)
SARS-COV-2 RNA SPEC QL NAA+PROBE: SIGNIFICANT CHANGE UP
SODIUM SERPL-SCNC: 139 MMOL/L — SIGNIFICANT CHANGE UP (ref 135–146)
TROPONIN T SERPL-MCNC: <0.01 NG/ML — SIGNIFICANT CHANGE UP
WBC # BLD: 9.72 K/UL — SIGNIFICANT CHANGE UP (ref 4.8–10.8)
WBC # FLD AUTO: 9.72 K/UL — SIGNIFICANT CHANGE UP (ref 4.8–10.8)

## 2022-06-20 PROCEDURE — 99291 CRITICAL CARE FIRST HOUR: CPT

## 2022-06-20 PROCEDURE — 99285 EMERGENCY DEPT VISIT HI MDM: CPT

## 2022-06-20 PROCEDURE — 0042T: CPT

## 2022-06-20 PROCEDURE — 93010 ELECTROCARDIOGRAM REPORT: CPT

## 2022-06-20 PROCEDURE — 70498 CT ANGIOGRAPHY NECK: CPT | Mod: 26,MA

## 2022-06-20 PROCEDURE — 70450 CT HEAD/BRAIN W/O DYE: CPT | Mod: 26,MA,59

## 2022-06-20 PROCEDURE — 70496 CT ANGIOGRAPHY HEAD: CPT | Mod: 26,MA

## 2022-06-20 RX ORDER — ATORVASTATIN CALCIUM 80 MG/1
80 TABLET, FILM COATED ORAL AT BEDTIME
Refills: 0 | Status: DISCONTINUED | OUTPATIENT
Start: 2022-06-20 | End: 2022-06-23

## 2022-06-20 RX ORDER — MECLIZINE HCL 12.5 MG
50 TABLET ORAL ONCE
Refills: 0 | Status: COMPLETED | OUTPATIENT
Start: 2022-06-20 | End: 2022-06-20

## 2022-06-20 RX ORDER — MECLIZINE HCL 12.5 MG
12.5 TABLET ORAL ONCE
Refills: 0 | Status: COMPLETED | OUTPATIENT
Start: 2022-06-20 | End: 2022-06-20

## 2022-06-20 RX ADMIN — Medication 12.5 MILLIGRAM(S): at 20:16

## 2022-06-20 NOTE — ED PROVIDER NOTE - PROGRESS NOTE DETAILS
SR: spoke with dr. ramsey from [facility] - recommends cta head/neck & perfusion. - not a TP A candidate as patient out of the window SR: spoke with dr. ramsey - admitted for q4 neuro checks.

## 2022-06-20 NOTE — ED PROVIDER NOTE - NSICDXPASTMEDICALHX_GEN_ALL_CORE_FT
PAST MEDICAL HISTORY:  Benign prostatic hyperplasia, unspecified whether lower urinary tract symptoms present     GERD (gastroesophageal reflux disease)     HTN (hypertension)     Hypercholesteremia     Left eye injury, sequela had nail in eye 17 yrs old-- caused some pupil and caused increased pressure

## 2022-06-20 NOTE — ED ADULT NURSE NOTE - NSIMPLEMENTINTERV_GEN_ALL_ED
Implemented All Fall with Harm Risk Interventions:  Leonidas to call system. Call bell, personal items and telephone within reach. Instruct patient to call for assistance. Room bathroom lighting operational. Non-slip footwear when patient is off stretcher. Physically safe environment: no spills, clutter or unnecessary equipment. Stretcher in lowest position, wheels locked, appropriate side rails in place. Provide visual cue, wrist band, yellow gown, etc. Monitor gait and stability. Monitor for mental status changes and reorient to person, place, and time. Review medications for side effects contributing to fall risk. Reinforce activity limits and safety measures with patient and family. Provide visual clues: red socks.

## 2022-06-20 NOTE — ED PROVIDER NOTE - OBJECTIVE STATEMENT
this is a 65 yo male presents to ed for evaluation of dizziness which started one hour after waking up

## 2022-06-20 NOTE — H&P ADULT - ASSESSMENT
a/p:  dizziness  r/o cva v vertigo  HTN    Pt w sudden onset of dizziness associated w nausea, + sensitivity to light. -fever/chills.  Symptoms are exacerbated w head position. Currently w/u is negative for cva, awaiting Neuro eval.  Will give a trial of meclizine as this may be vertigo.  Q4h neuro checks, possible MRI. admit to stepdown.  Monitor BP.    CNS: neuro f/u, possible MRI, trial of meclizine,     Cardio: monitor BP    Respiratory: prn supplemental O2    GI: oral diet    ID: monitor off abx    hema: monitor hgb/plt    Renal: monitor UO/creat    Electrolytes: monitor k, mg, PO4    Musculoskeletal: oob to chair      critical care time spent: 30 minutes

## 2022-06-20 NOTE — H&P ADULT - HISTORY OF PRESENT ILLNESS
pt reports sudden onset of dizziness this am while watching, TV.  Reports + nausea, no vomiting. + sensitivity to light, no ringing in the ears, but states his auditory acuity has been decreased x 1 week. + reproducable with head position

## 2022-06-20 NOTE — ED PROVIDER NOTE - CLINICAL SUMMARY MEDICAL DECISION MAKING FREE TEXT BOX
Care: 66-year-old male with past medical history of hypertension GERD hyperlipidemia presents here complaining of cute onset dizziness described as room spinning since 9:30 AM.  Patient woke up at 8 AM was fine and then suddenly began dizziness at 930.  Stroke code activated upon evaluation patient.  No blurry vision no neck pain trauma chest pain shortness of breath nausea vomiting weakness in arms or legs numbness or tingling abdominal pain or back pain.   VS reviewed labs imaging ekg obtained and reviewed - spoke with telestroke not a TP A candidate as out of the [place], recommend cta/perfussion and then recommended admission to SDU - icu attending aware.

## 2022-06-20 NOTE — ED PROVIDER NOTE - ATTENDING APP SHARED VISIT CONTRIBUTION OF CARE
66-year-old male with past medical history of hypertension GERD hyperlipidemia presents here complaining of cute onset dizziness described as room spinning since 9:30 AM.  Patient woke up at 8 AM was fine and then suddenly began dizziness at 930.  Stroke code activated upon evaluation patient.  No blurry vision no neck pain trauma chest pain shortness of breath nausea vomiting weakness in arms or legs numbness or tingling abdominal pain or back pain.    CONSTITUTIONAL: WA / WN / NAD  HEAD: NCAT  EYES: PERRL; + left beating nystagmus & vertical nystagmus  ENT: Normal pharynx; mucous membranes pink/moist, no erythema.  NECK: Supple;   CARD: RRR; nl S1/S2; no M/R/G. Pulses equal bilaterally.  RESP: Respiratory rate and effort are normal; breath sounds clear and equal bilaterally.  ABD: Soft, NT ND nl bowel sounds; no masses; no rebound  MSK/EXT: No gross deformities; full range of motion.  SKIN: Warm and dry;   NEURO: AAOx3, Motor 5/5 x 4 extremities normal speech no dysmetria or dysarthria. + left beating & vertical nystagmus. no drift.  PSYCH: Memory Intact, Normal Affect

## 2022-06-20 NOTE — ED PROVIDER NOTE - NS ED ROS FT
Review of Systems:  	•	CONSTITUTIONAL - no fever, no diaphoresis, no chills  	•	SKIN - no rash  	•	HEMATOLOGIC - no bleeding, no bruising  	•	EYES - no eye pain, no blurry vision  	•	ENT - no change in hearing, no sore throat, no ear pain or tinnitus  	•	RESPIRATORY - no shortness of breath, no cough  	•	CARDIAC - no chest pain, no palpitations    	•	MUSCULOSKELETAL - no joint paint, no swelling, no redness  	•	NEUROLOGIC -  positive dizziness no weakness, no headache, no paresthesias, no LOC  	•	PSYCH - no anxiety, non suicidal, non homicidal, no hallucination, no depression

## 2022-06-20 NOTE — ED PROVIDER NOTE - NS ED ATTENDING STATEMENT MOD
This was a shared visit with the GEOVANY. I reviewed and verified the documentation and independently performed the documented:

## 2022-06-20 NOTE — H&P ADULT - NSHPLABSRESULTS_GEN_ALL_CORE
14.9   9.72  )-----------( 228      ( 20 Jun 2022 15:24 )             45.6       06-20    139  |  104  |  29<H>  ----------------------------<  122<H>  4.7   |  24  |  1.2    Ca    9.7      20 Jun 2022 15:24    TPro  7.4  /  Alb  5.3<H>  /  TBili  0.4  /  DBili  x   /  AST  23  /  ALT  30  /  AlkPhos  46  06-20                  PT/INR - ( 20 Jun 2022 15:24 )   PT: 11.60 sec;   INR: 1.01 ratio         PTT - ( 20 Jun 2022 15:24 )  PTT:33.6 sec    Lactate Trend      CARDIAC MARKERS ( 20 Jun 2022 15:24 )  x     / <0.01 ng/mL / x     / x     / x            CAPILLARY BLOOD GLUCOSE      POCT Blood Glucose.: 128 mg/dL (20 Jun 2022 14:29)        Ct angio head/neck    IMPRESSION:    CT PERFUSION:  No evidence of perfusion abnormality.    CTA HEAD:  No evidence of flow-limiting stenosis, occlusion or aneurysm.    CTA NECK:  Mild stenosis of the proximal left internal carotid artery due to   eccentric calcification.      Ct head    IMPRESSION:  No CT evidence of large acute territorial infarct.    No evidence of acute intracranial pathology. No mass effect, midline   shift or intracranial hemorrhage.    These findings were discussed with Dr. DANNY EISENBERG [phone] at   6/20/2022 2:52 PM by Dr. Adames with read back confirmation.

## 2022-06-21 LAB
A1C WITH ESTIMATED AVERAGE GLUCOSE RESULT: 5.7 % — HIGH (ref 4–5.6)
ALBUMIN SERPL ELPH-MCNC: 5 G/DL — SIGNIFICANT CHANGE UP (ref 3.5–5.2)
ALP SERPL-CCNC: 39 U/L — SIGNIFICANT CHANGE UP (ref 30–115)
ALT FLD-CCNC: 28 U/L — SIGNIFICANT CHANGE UP (ref 0–41)
ANION GAP SERPL CALC-SCNC: 11 MMOL/L — SIGNIFICANT CHANGE UP (ref 7–14)
APTT BLD: 35 SEC — SIGNIFICANT CHANGE UP (ref 27–39.2)
AST SERPL-CCNC: 21 U/L — SIGNIFICANT CHANGE UP (ref 0–41)
BILIRUB DIRECT SERPL-MCNC: <0.2 MG/DL — SIGNIFICANT CHANGE UP (ref 0–0.3)
BILIRUB INDIRECT FLD-MCNC: >0.2 MG/DL — SIGNIFICANT CHANGE UP (ref 0.2–1.2)
BILIRUB SERPL-MCNC: 0.4 MG/DL — SIGNIFICANT CHANGE UP (ref 0.2–1.2)
BUN SERPL-MCNC: 24 MG/DL — HIGH (ref 10–20)
CALCIUM SERPL-MCNC: 9.9 MG/DL — SIGNIFICANT CHANGE UP (ref 8.5–10.1)
CHLORIDE SERPL-SCNC: 104 MMOL/L — SIGNIFICANT CHANGE UP (ref 98–110)
CHOLEST SERPL-MCNC: 201 MG/DL — HIGH
CO2 SERPL-SCNC: 27 MMOL/L — SIGNIFICANT CHANGE UP (ref 17–32)
CREAT SERPL-MCNC: 1.4 MG/DL — SIGNIFICANT CHANGE UP (ref 0.7–1.5)
EGFR: 56 ML/MIN/1.73M2 — LOW
ESTIMATED AVERAGE GLUCOSE: 117 MG/DL — HIGH (ref 68–114)
GLUCOSE SERPL-MCNC: 106 MG/DL — HIGH (ref 70–99)
HCT VFR BLD CALC: 44.3 % — SIGNIFICANT CHANGE UP (ref 42–52)
HCV AB S/CO SERPL IA: 0.04 COI — SIGNIFICANT CHANGE UP
HCV AB SERPL-IMP: SIGNIFICANT CHANGE UP
HDLC SERPL-MCNC: 35 MG/DL — LOW
HGB BLD-MCNC: 14.6 G/DL — SIGNIFICANT CHANGE UP (ref 14–18)
LIPID PNL WITH DIRECT LDL SERPL: 132 MG/DL — HIGH
MCHC RBC-ENTMCNC: 30.2 PG — SIGNIFICANT CHANGE UP (ref 27–31)
MCHC RBC-ENTMCNC: 33 G/DL — SIGNIFICANT CHANGE UP (ref 32–37)
MCV RBC AUTO: 91.5 FL — SIGNIFICANT CHANGE UP (ref 80–94)
NON HDL CHOLESTEROL: 166 MG/DL — HIGH
NRBC # BLD: 0 /100 WBCS — SIGNIFICANT CHANGE UP (ref 0–0)
PLATELET # BLD AUTO: 231 K/UL — SIGNIFICANT CHANGE UP (ref 130–400)
POTASSIUM SERPL-MCNC: 4.6 MMOL/L — SIGNIFICANT CHANGE UP (ref 3.5–5)
POTASSIUM SERPL-SCNC: 4.6 MMOL/L — SIGNIFICANT CHANGE UP (ref 3.5–5)
PROT SERPL-MCNC: 7.3 G/DL — SIGNIFICANT CHANGE UP (ref 6–8)
RBC # BLD: 4.84 M/UL — SIGNIFICANT CHANGE UP (ref 4.7–6.1)
RBC # FLD: 13.4 % — SIGNIFICANT CHANGE UP (ref 11.5–14.5)
SODIUM SERPL-SCNC: 142 MMOL/L — SIGNIFICANT CHANGE UP (ref 135–146)
TRIGL SERPL-MCNC: 172 MG/DL — HIGH
WBC # BLD: 10.75 K/UL — SIGNIFICANT CHANGE UP (ref 4.8–10.8)
WBC # FLD AUTO: 10.75 K/UL — SIGNIFICANT CHANGE UP (ref 4.8–10.8)

## 2022-06-21 PROCEDURE — 99222 1ST HOSP IP/OBS MODERATE 55: CPT

## 2022-06-21 PROCEDURE — 99233 SBSQ HOSP IP/OBS HIGH 50: CPT

## 2022-06-21 RX ORDER — ASPIRIN/CALCIUM CARB/MAGNESIUM 324 MG
81 TABLET ORAL DAILY
Refills: 0 | Status: DISCONTINUED | OUTPATIENT
Start: 2022-06-21 | End: 2022-06-23

## 2022-06-21 RX ORDER — PANTOPRAZOLE SODIUM 20 MG/1
40 TABLET, DELAYED RELEASE ORAL
Refills: 0 | Status: DISCONTINUED | OUTPATIENT
Start: 2022-06-21 | End: 2022-06-23

## 2022-06-21 RX ORDER — DORZOLAMIDE HYDROCHLORIDE 20 MG/ML
1 SOLUTION/ DROPS OPHTHALMIC
Refills: 0 | Status: DISCONTINUED | OUTPATIENT
Start: 2022-06-21 | End: 2022-06-23

## 2022-06-21 RX ORDER — ENOXAPARIN SODIUM 100 MG/ML
40 INJECTION SUBCUTANEOUS EVERY 24 HOURS
Refills: 0 | Status: DISCONTINUED | OUTPATIENT
Start: 2022-06-21 | End: 2022-06-23

## 2022-06-21 RX ORDER — MECLIZINE HCL 12.5 MG
25 TABLET ORAL THREE TIMES A DAY
Refills: 0 | Status: DISCONTINUED | OUTPATIENT
Start: 2022-06-21 | End: 2022-06-23

## 2022-06-21 RX ORDER — TIMOLOL 0.5 %
1 DROPS OPHTHALMIC (EYE)
Refills: 0 | Status: DISCONTINUED | OUTPATIENT
Start: 2022-06-21 | End: 2022-06-23

## 2022-06-21 RX ORDER — DORZOLAMIDE HYDROCHLORIDE TIMOLOL MALEATE 20; 5 MG/ML; MG/ML
1 SOLUTION/ DROPS OPHTHALMIC
Refills: 0 | Status: DISCONTINUED | OUTPATIENT
Start: 2022-06-21 | End: 2022-06-21

## 2022-06-21 RX ADMIN — DORZOLAMIDE HYDROCHLORIDE 1 DROP(S): 20 SOLUTION/ DROPS OPHTHALMIC at 18:39

## 2022-06-21 RX ADMIN — DORZOLAMIDE HYDROCHLORIDE 1 DROP(S): 20 SOLUTION/ DROPS OPHTHALMIC at 09:05

## 2022-06-21 RX ADMIN — ATORVASTATIN CALCIUM 80 MILLIGRAM(S): 80 TABLET, FILM COATED ORAL at 21:27

## 2022-06-21 RX ADMIN — Medication 25 MILLIGRAM(S): at 12:20

## 2022-06-21 RX ADMIN — ATORVASTATIN CALCIUM 80 MILLIGRAM(S): 80 TABLET, FILM COATED ORAL at 02:27

## 2022-06-21 RX ADMIN — Medication 81 MILLIGRAM(S): at 17:38

## 2022-06-21 RX ADMIN — Medication 25 MILLIGRAM(S): at 21:27

## 2022-06-21 RX ADMIN — PANTOPRAZOLE SODIUM 40 MILLIGRAM(S): 20 TABLET, DELAYED RELEASE ORAL at 07:37

## 2022-06-21 RX ADMIN — Medication 1 DROP(S): at 18:40

## 2022-06-21 RX ADMIN — ENOXAPARIN SODIUM 40 MILLIGRAM(S): 100 INJECTION SUBCUTANEOUS at 12:40

## 2022-06-21 NOTE — OCCUPATIONAL THERAPY INITIAL EVALUATION ADULT - LEVEL OF INDEPENDENCE: DRESS LOWER BODY, OT EVAL
Transport arrived to  pt and transfer to [address] [place] [address]. Unable to be assessed at this time. TBA when appropriate.

## 2022-06-21 NOTE — PROGRESS NOTE ADULT - ASSESSMENT
#vertigo - likley peripheral but since not resolving/continous - need to rule out cva  meclizine around the clock  for now then switch to prn   Pt/OT  seen by neuro - awaiting MRI brain noncon - pending   asa/statin  discussed with neuro team - can downgrade to tele q 8 neuro checks    #HTN stable  #hyperlipidemia/hypertrigyceridemia  - cont high dose statin  #likely has jared - needs outpt sleep study  #vertigo - likley peripheral but since not resolving/continous - need to rule out cva vs menieres dz  meclizine around the clock  for now then switch to prn   Pt/OT  seen by neuro - awaiting MRI brain noncon - pending   asa/statin  discussed with neuro team - can downgrade to tele q 8 neuro checks    #HTN stable  #hyperlipidemia/hypertrigyceridemia  - cont high dose statin  #likely has jared - needs outpt sleep study

## 2022-06-21 NOTE — OCCUPATIONAL THERAPY INITIAL EVALUATION ADULT - GENERAL OBSERVATIONS, REHAB EVAL
14:40-14:53; chart reviewed, ok to treat by OT as confirmed by CRICKET Kramer, Pt received semifowler ED stretcher +tele +pulse ox +2L O2 via nc in NAD. As per RN, pt awaiting transport to [address] [place] [address] no OOB. Transport arrived to  pt during OT IE; ADLs and transfers TBA. Pt denied pain at rest, reported dizziness; in agreement with OT IE.

## 2022-06-21 NOTE — PATIENT PROFILE ADULT - FALL HARM RISK - HARM RISK INTERVENTIONS
Assistance with ambulation/Assistance OOB with selected safe patient handling equipment/Communicate Risk of Fall with Harm to all staff/Monitor gait and stability/Reinforce activity limits and safety measures with patient and family/Sit up slowly, dangle for a short time, stand at bedside before walking/Tailored Fall Risk Interventions/Visual Cue: Yellow wristband and red socks/Bed in lowest position, wheels locked, appropriate side rails in place/Call bell, personal items and telephone in reach/Instruct patient to call for assistance before getting out of bed or chair/Non-slip footwear when patient is out of bed/[place] to call system/Physically safe environment - no spills, clutter or unnecessary equipment/Purposeful Proactive Rounding/Room/bathroom lighting operational, light cord in reach

## 2022-06-21 NOTE — OCCUPATIONAL THERAPY INITIAL EVALUATION ADULT - LIGHT TOUCH SENSATION, LUE, REHAB EVAL
Pt picked up for transport to [address] [place] [address], unable to be assessed at this time. TBA when appropriate.

## 2022-06-21 NOTE — PROGRESS NOTE ADULT - SUBJECTIVE AND OBJECTIVE BOX
TEAGAN GAMEZ  66y, Male  Allergy: Pseudoephedrine Sinus (Rash)      CHIEF COMPLAINT: dizziness (21 Jun 2022 12:04)      HPI:  pt reports sudden onset of dizziness this am while watching, TV.  Reports + nausea, no vomiting. + sensitivity to light, no ringing in the ears, but states his auditory acuity has been decreased x 1 week. + reproducable with head position (20 Jun 2022 18:31)    HPI:    FAMILY HISTORY:  No pertinent family history in first degree relatives      PAST MEDICAL & SURGICAL HISTORY:  Benign prostatic hyperplasia, unspecified whether lower urinary tract symptoms present      HTN (hypertension)      Hypercholesteremia      GERD (gastroesophageal reflux disease)      Left eye injury, sequela  had nail in eye 17 yrs old-- caused some pupil and caused increased pressure      History of surgery  deviaed septum surgery  endoscopy  coloonscopy          SOCIAL HISTORY  Social History:  -tobacco  +social etoh use  -ivda (20 Jun 2022 18:31)        ROS  General: Denies fevers, chills, nightsweats, weight loss +room spinning - slightly improved since coming to ER   HEENT: Denies headache, rhinorrhea, sore throat, eye pain  CV: Denies CP, palpitations  PULM: Denies SOB, cough  GI: Denies abdominal pain, diarrhea  : Denies dysuria, hematuria  MSK: Denies arthralgias  SKIN: Denies rash   NEURO: Denies paresthesias, weakness  PSYCH: Denies depression    VITALS:  T(F): 97.2, Max: 98 (06-21-22 @ 00:28)  HR: 67  BP: 128/98  RR: 20Vital Signs Last 24 Hrs  T(C): 36.2 (21 Jun 2022 07:15), Max: 36.7 (21 Jun 2022 00:28)  T(F): 97.2 (21 Jun 2022 07:15), Max: 98 (21 Jun 2022 00:28)  HR: 67 (21 Jun 2022 07:15) (47 - 67)  BP: 128/98 (21 Jun 2022 07:15) (128/76 - 208/98)  BP(mean): --  RR: 20 (21 Jun 2022 07:15) (18 - 20)  SpO2: 98% (21 Jun 2022 07:15) (97% - 100%)    PHYSICAL EXAM:  Gen: NAD, resting in bed morobid obese  HEENT: Normocephalic, atraumatic, mild nystagmus to left   Neck: supple, no lymphadenopathy  CV: Regular rate & regular rhythm  Lungs: decreased BS at bases, no fremitus  Abdomen: Soft, BS present  Ext: Warm, well perfused  Neuro: non focal, awake, sensory intact, CN intact aaox3  Skin: no rash, no erythema  Psych: no SI, HI, Hallucination     TESTS & MEASUREMENTS:                        14.6   10.75 )-----------( 231      ( 21 Jun 2022 07:23 )             44.3     06-21    142  |  104  |  24<H>  ----------------------------<  106<H>  4.6   |  27  |  1.4    Ca    9.9      21 Jun 2022 07:23    TPro  7.3  /  Alb  5.0  /  TBili  0.4  /  DBili  <0.2  /  AST  21  /  ALT  28  /  AlkPhos  39  06-21      LIVER FUNCTIONS - ( 21 Jun 2022 07:23 )  Alb: 5.0 g/dL / Pro: 7.3 g/dL / ALK PHOS: 39 U/L / ALT: 28 U/L / AST: 21 U/L / GGT: x                   QRS axis to [] ° and NSR at a rate of [] BPM. There was no atrial enlargement. There was no ventricular hypertrophy. There were no ST-T changes and all intervals were normal.      INFECTIOUS DISEASES TESTING      RADIOLOGY & ADDITIONAL TESTS:  I have personally reviewed the last Chest xray  CXR      CT      CARDIOLOGY TESTING  12 Lead ECG:   Ventricular Rate 51 BPM    Atrial Rate 51 BPM    P-R Interval 168 ms    QRS Duration 100 ms    Q-T Interval 456 ms    QTC Calculation(Bazett) 420 ms    P Axis 54 degrees    R Axis 117 degrees    T Axis -6 degrees    Diagnosis Line Sinus bradycardia  Right axis deviation  Poor R wave progression  Abnormal ECG    Confirmed by Kvng Christianson ([id number]) on 6/20/2022 3:43:56 PM (06-20-22 @ 14:32)      MEDICATIONS  atorvastatin 80  dorzolamide 2% Ophthalmic Solution 1  enoxaparin Injectable 40  meclizine 25  pantoprazole    Tablet 40  timolol 0.5% Solution 1      ANTIBIOTICS:      All available historical data has been reviewed    ASSESSMENT  66y M admitted with DIZZINESS        PROBLEMS

## 2022-06-21 NOTE — OCCUPATIONAL THERAPY INITIAL EVALUATION ADULT - LIVES WITH, PROFILE
lives with girlfriend in a private home +4 steps to enter with bilateral handrails +1 flight of steps to basement with left handrail +tub/significant other

## 2022-06-21 NOTE — OCCUPATIONAL THERAPY INITIAL EVALUATION ADULT - LEVEL OF INDEPENDENCE: BED TO CHAIR, REHAB EVAL
Pt received and left in supported long sitting. As per CRICKET Kramer, pt waiting for transport to [address] [place] [address], no OOB at this time. TBA when appropriate.

## 2022-06-21 NOTE — OCCUPATIONAL THERAPY INITIAL EVALUATION ADULT - LEVEL OF INDEPENDENCE:TOILET, OT EVAL
Transport arrived to  pt and transfer to [address] [place] [address]. Unable to be assessed at this time. TBA when appropriate.

## 2022-06-21 NOTE — OCCUPATIONAL THERAPY INITIAL EVALUATION ADULT - RANGE OF MOTION EXAMINATION
Right AROM shoulder/wrist/hand WFL, elbow 3/4 range; Left AROM shoulder/wrist/hand WFL, elbow 3/4 range/deficits as listed below

## 2022-06-21 NOTE — CONSULT NOTE ADULT - SUBJECTIVE AND OBJECTIVE BOX
Neurology Consult  SARAY Restrepo [id number]    Patient is a 66y old  Male who presents with a chief complaint of dizziness (21 Jun 2022 09:06)      HPI:  pt reports sudden onset of dizziness this am while watching, TV.  Reports + nausea, no vomiting. + sensitivity to light, no ringing in the ears, but states his auditory acuity has been decreased x 1 week. + reproducable with head position (20 Jun 2022 18:31)      Interim HPI -  Pt seen at bedside with Dr Adair, patient lying in bed complaining of mild dizziness. Patient states dizziness started yesterday, and has continued since then coming to the hospital.    Pt denies any relief until taking medication in ER.  Pt states dizziness was so severe yesterday he was unable to ambulate and fell to ground.  Pt denies prior hx of any abnormalities, patient denies prior hx.  Pt admits to associated hearing concern the past week with decreasing hearing concern, no prior hx.      PAST MEDICAL & SURGICAL HISTORY:  Benign prostatic hyperplasia, unspecified whether lower urinary tract symptoms present  HTN (hypertension)  Hypercholesteremia  GERD (gastroesophageal reflux disease)  Left eye injury, sequela  had nail in eye 17 yrs old-- caused some pupil and caused increased pressure  History of surgery  deviaed septum surgery  endoscopy  coloonscopy      FAMILY HISTORY:  No pertinent family history in first degree relatives        Allergies  Pseudoephedrine Sinus (Rash)  Intolerances        MEDICATIONS  (STANDING):  atorvastatin 80 milliGRAM(s) Oral at bedtime  dorzolamide 2% Ophthalmic Solution 1 Drop(s) Both EYES <User Schedule>  enoxaparin Injectable 40 milliGRAM(s) SubCutaneous every 24 hours  meclizine 25 milliGRAM(s) Oral three times a day  pantoprazole    Tablet 40 milliGRAM(s) Oral before breakfast  timolol 0.5% Solution 1 Drop(s) Both EYES two times a day    MEDICATIONS  (PRN):    Review of systems:    Constitutional: No fever, weight loss or fatigue    Eyes: No eye pain or discharge  ENMT:  see HPI  Neck: No pain or stiffness  Respiratory: No cough, wheezing, chills or hemoptysis  Cardiovascular: No chest pain, palpitations, shortness of breath, dyspnea on exertion  Gastrointestinal: No abdominal pain, nausea, vomiting or hematemesis; No diarrhea or constipation.   Genitourinary: No dysuria, frequency, hematuria or incontinence  Neurological: As per HPI  Skin: No rashes or lesions   Endocrine: No heat or cold intolerance; No hair loss  Musculoskeletal: No joint pain or swelling  Psychiatric: No depression, anxiety, mood swings  Heme/Lymph: No easy bruising or bleeding gums    Vital Signs Last 24 Hrs  T(C): 36.2 (21 Jun 2022 07:15), Max: 36.7 (21 Jun 2022 00:28)  T(F): 97.2 (21 Jun 2022 07:15), Max: 98 (21 Jun 2022 00:28)  HR: 67 (21 Jun 2022 07:15) (47 - 67)  BP: 128/98 (21 Jun 2022 07:15) (128/76 - 208/98)  BP(mean): --  RR: 20 (21 Jun 2022 07:15) (18 - 20)  SpO2: 98% (21 Jun 2022 07:15) (97% - 100%)    Neurologic Examination:  General:  Appearance is consistent with chronologic age.  No abnormal facies.   General: The patient is oriented to person, place, time and date.  Recent and remote memory intact.  Fund of knowledge is intact and normal.  Language with normal repetition, comprehension and naming.  Nondysarthric.    Cranial nerves: intact VA, VFF.  EOMI ++ Beating left nystagmus, + Catch-up Saccade LT>RT, No reported double vision.  PERRL.  No ptosis/weakness of eyelid closure.  Facial sensation is normal with normal bite.  No facial asymmetry.  Hearing grossly intact b/l.  Palate elevates midline.  Tongue midline.  Motor examination:   Normal tone, bulk and range of motion.  No tenderness, twitching, tremors or involuntary movements.  Formal Muscle Strength Testing: (MRC grade R/L) 5/5 UE; 5/5 LE.  No observable drift.  Reflexes:   2+ b/l pectoralis, biceps, triceps, brachioradialis, patella and Achilles.  Plantar response downgoing b/l.  Jaw jerk, Jillian, clonus absent.  Sensory examination:   Intact to light touch and pinprick, pain, temperature and proprioception and vibration in all extremities.  Cerebellum:   FTN/HKS intact with normal DOMO in all limbs.  No dysmetria or dysdiadokinesia.  Gait narrow based and normal- + Dizziness when standing    Labs:   CBC Full  -  ( 21 Jun 2022 07:23 )  WBC Count : 10.75 K/uL  RBC Count : 4.84 M/uL  Hemoglobin : 14.6 g/dL  Hematocrit : 44.3 %  Platelet Count - Automated : 231 K/uL  Mean Cell Volume : 91.5 fL  Mean Cell Hemoglobin : 30.2 pg  Mean Cell Hemoglobin Concentration : 33.0 g/dL  Auto Neutrophil # :     142  |  104  |  24<H>  ----------------------------<  106<H>  4.6   |  27  |  1.4    Ca    9.9      21 Jun 2022 07:23    TPro  7.3  /  Alb  5.0  /  TBili  0.4  /  DBili  <0.2  /  AST  21  /  ALT  28  /  AlkPhos  39  06-21    LIVER FUNCTIONS - ( 21 Jun 2022 07:23 )  Alb: 5.0 g/dL / Pro: 7.3 g/dL / ALK PHOS: 39 U/L / ALT: 28 U/L / AST: 21 U/L / GGT: x           PT/INR - ( 20 Jun 2022 15:24 )   PT: 11.60 sec;   INR: 1.01 ratio         PTT - ( 21 Jun 2022 07:23 )  PTT:35.0 sec    Neuroimaging:  NCHCT: < from: CT Brain Stroke Protocol (06.20.22 @ 14:48) >  IMPRESSION:  No CT evidence of large acuteterritorial infarct.    No evidence of acute intracranial pathology. No mass effect, midline   shift or intracranial hemorrhage.    These findings were discussed with Dr. DANNY EISENBERG [phone] at   6/20/2022 2:52 PM by Dr. Ocampo with read back confirmation.    --- End of Report ---        RODRIGUEZ OCAMPO MD; Attending Radiologist  This document has been electronically signed. Jun 20 2022  2:55PM    < end of copied text >    CT Angiography/Perfusion:  < from: CT Angio Neck w/ IV Cont (06.20.22 @ 16:06) >  IMPRESSION:    CT PERFUSION:  No evidence of perfusion abnormality.    CTA HEAD:  No evidence of flow-limiting stenosis, occlusion or aneurysm.    CTA NECK:  Mild stenosis of the proximal left internal carotid artery due to   eccentric calcification.    --- End of Report ---        RODRIUGEZ OCAMPO MD; Attending Radiologist  This document has been electronically signed. Jun 20 2022  4:19PM    < end of copied text >        Assessment:  This is a 66y Male with h/o HTN, Dyslipidemia, +Smoker, BPH presents to the hospital for one day of continuous dizziness with inability to ambulate due to dizziness.  Admitted for dizziness likely Continuous Vertigo.  NIHSS 0    Plan:   - CTH noted above no acute infarct or bleed  - CTA completed noted above  - Obtain MRI brain NO contrast  - ASA 81mg and Atorvastatin 80mg daily  - q8 neuro checks   - Meclizine prn  - orthostatic vital q shift  - Recommend NO Caffeine, no Nicotine, and low Na   - outpatient ENT ? [facility]  - outpatient Audiogram  - Neurology team will follow.      06-21-22 @ 12:05       Neurology Consult  SARAY Restrepo [id number]    Patient is a 66y old  Male who presents with a chief complaint of dizziness (21 Jun 2022 09:06)      HPI:  pt reports sudden onset of dizziness this am while watching, TV.  Reports + nausea, no vomiting. + sensitivity to light, no ringing in the ears, but states his auditory acuity has been decreased x 1 week. + reproducable with head position (20 Jun 2022 18:31)      Interim HPI -  Pt seen at bedside with Dr Adair, patient lying in bed complaining of mild dizziness. Patient states dizziness started yesterday, and has continued since then coming to the hospital.    Pt denies any relief until taking medication in ER.  Pt states dizziness was so severe yesterday he was unable to ambulate and fell to ground.  Pt denies prior hx of any abnormalities, patient denies prior hx.  Pt admits to associated hearing concern the past week with decreasing hearing concern, no prior hx.      PAST MEDICAL & SURGICAL HISTORY:  Benign prostatic hyperplasia, unspecified whether lower urinary tract symptoms present  HTN (hypertension)  Hypercholesteremia  GERD (gastroesophageal reflux disease)  Left eye injury, sequela  had nail in eye 17 yrs old-- caused some pupil and caused increased pressure  History of surgery  deviated septum surgery  endoscopy  coloonscopy      FAMILY HISTORY:  No pertinent family history in first degree relatives        Allergies  Pseudoephedrine Sinus (Rash)  Intolerances        MEDICATIONS  (STANDING):  atorvastatin 80 milliGRAM(s) Oral at bedtime  dorzolamide 2% Ophthalmic Solution 1 Drop(s) Both EYES <User Schedule>  enoxaparin Injectable 40 milliGRAM(s) SubCutaneous every 24 hours  meclizine 25 milliGRAM(s) Oral three times a day  pantoprazole    Tablet 40 milliGRAM(s) Oral before breakfast  timolol 0.5% Solution 1 Drop(s) Both EYES two times a day    MEDICATIONS  (PRN):    Review of systems:    Constitutional: No fever, weight loss or fatigue    Eyes: No eye pain or discharge  ENMT:  see HPI  Neck: No pain or stiffness  Respiratory: No cough, wheezing, chills or hemoptysis  Cardiovascular: No chest pain, palpitations, shortness of breath, dyspnea on exertion  Gastrointestinal: No abdominal pain, nausea, vomiting or hematemesis; No diarrhea or constipation.   Genitourinary: No dysuria, frequency, hematuria or incontinence  Neurological: As per HPI  Skin: No rashes or lesions   Endocrine: No heat or cold intolerance; No hair loss  Musculoskeletal: No joint pain or swelling  Psychiatric: No depression, anxiety, mood swings  Heme/Lymph: No easy bruising or bleeding gums    Vital Signs Last 24 Hrs  T(C): 36.2 (21 Jun 2022 07:15), Max: 36.7 (21 Jun 2022 00:28)  T(F): 97.2 (21 Jun 2022 07:15), Max: 98 (21 Jun 2022 00:28)  HR: 67 (21 Jun 2022 07:15) (47 - 67)  BP: 128/98 (21 Jun 2022 07:15) (128/76 - 208/98)  BP(mean): --  RR: 20 (21 Jun 2022 07:15) (18 - 20)  SpO2: 98% (21 Jun 2022 07:15) (97% - 100%)    Neurologic Examination:  General:  Appearance is consistent with chronologic age.  No abnormal facies.   General: The patient is oriented to person, place, time and date.  Recent and remote memory intact.  Fund of knowledge is intact and normal.  Language with normal repetition, comprehension and naming.  Nondysarthric.    Cranial nerves: intact VA, VFF.  EOMI ++ Beating left nystagmus, + Catch-up Saccade LT>RT, No reported double vision.  PERRL.  No ptosis/weakness of eyelid closure.  Facial sensation is normal with normal bite.  No facial asymmetry.  Hearing grossly intact b/l.  Palate elevates midline.  Tongue midline.  Motor examination:   Normal tone, bulk and range of motion.  No tenderness, twitching, tremors or involuntary movements.  Formal Muscle Strength Testing: (MRC grade R/L) 5/5 UE; 5/5 LE.  No observable drift.  Reflexes:   2+ b/l pectoralis, biceps, triceps, brachioradialis, patella and Achilles.  Plantar response downgoing b/l.  Jaw jerk, Jillian, clonus absent.  Sensory examination:   Intact to light touch and pinprick, pain, temperature and proprioception and vibration in all extremities.  Cerebellum:   FTN/HKS intact with normal DOMO in all limbs.  No dysmetria or dysdiadokinesia.  Gait narrow based and normal- + Dizziness when standing    Labs:   CBC Full  -  ( 21 Jun 2022 07:23 )  WBC Count : 10.75 K/uL  RBC Count : 4.84 M/uL  Hemoglobin : 14.6 g/dL  Hematocrit : 44.3 %  Platelet Count - Automated : 231 K/uL  Mean Cell Volume : 91.5 fL  Mean Cell Hemoglobin : 30.2 pg  Mean Cell Hemoglobin Concentration : 33.0 g/dL  Auto Neutrophil # :     142  |  104  |  24<H>  ----------------------------<  106<H>  4.6   |  27  |  1.4    Ca    9.9      21 Jun 2022 07:23    TPro  7.3  /  Alb  5.0  /  TBili  0.4  /  DBili  <0.2  /  AST  21  /  ALT  28  /  AlkPhos  39  06-21    LIVER FUNCTIONS - ( 21 Jun 2022 07:23 )  Alb: 5.0 g/dL / Pro: 7.3 g/dL / ALK PHOS: 39 U/L / ALT: 28 U/L / AST: 21 U/L / GGT: x           PT/INR - ( 20 Jun 2022 15:24 )   PT: 11.60 sec;   INR: 1.01 ratio         PTT - ( 21 Jun 2022 07:23 )  PTT:35.0 sec    Neuroimaging:  NCHCT: < from: CT Brain Stroke Protocol (06.20.22 @ 14:48) >  IMPRESSION:  No CT evidence of large acuteterritorial infarct.    No evidence of acute intracranial pathology. No mass effect, midline   shift or intracranial hemorrhage.    These findings were discussed with Dr. ADNNY EISENBERG [phone] at   6/20/2022 2:52 PM by Dr. Ocampo with read back confirmation.    --- End of Report ---        RODRIGUEZ OCAMPO MD; Attending Radiologist  This document has been electronically signed. Jun 20 2022  2:55PM    < end of copied text >    CT Angiography/Perfusion:  < from: CT Angio Neck w/ IV Cont (06.20.22 @ 16:06) >  IMPRESSION:    CT PERFUSION:  No evidence of perfusion abnormality.    CTA HEAD:  No evidence of flow-limiting stenosis, occlusion or aneurysm.    CTA NECK:  Mild stenosis of the proximal left internal carotid artery due to   eccentric calcification.    --- End of Report ---        RODRIGUEZ OCAMPO MD; Attending Radiologist  This document has been electronically signed. Jun 20 2022  4:19PM    < end of copied text >        Assessment:  This is a 66y Male with h/o HTN, Dyslipidemia, +Smoker, BPH presents to the hospital for one day of continuous dizziness with inability to ambulate due to dizziness.  Admitted for dizziness likely Continuous Vertigo.  NIHSS 0    Plan:   - CTH noted above no acute infarct or bleed  - CTA completed noted above  - Obtain MRI brain NO contrast *** however may need study to exclude metal in orbit due to hx of nail in eye age 17, please check with radiologist ***  - ASA 81mg and Atorvastatin 80mg daily  - q8 neuro checks   - Meclizine prn  - orthostatic vital q shift  - Recommend NO Caffeine, no Nicotine, and low Na   - outpatient ENT ? [facility]  - outpatient Audiogram  - Neurology team will follow.      06-21-22 @ 12:05

## 2022-06-21 NOTE — OCCUPATIONAL THERAPY INITIAL EVALUATION ADULT - PERTINENT HX OF CURRENT PROBLEM, REHAB EVAL
As per H&P; 66 year old male; reports sudden onset of dizziness this am while watching TV.  Reports + nausea, no vomiting. + sensitivity to light, no ringing in the ears, but states his auditory acuity has been decreased x 1 week. + reproducable with head position. CT brain 6/20 No evidence of acute intracranial pathology. No mass effect, midline shift or intracranial hemorrhage. MRI pending

## 2022-06-21 NOTE — OCCUPATIONAL THERAPY INITIAL EVALUATION ADULT - LEVEL OF INDEPENDENCE: TOILET, REHAB EVAL
Pt received and left in supported long sitting. As per CRICKET Kramer, pt waiting for transport to [address] [place] [address], no OOB at this time. TBA when appropriate.

## 2022-06-21 NOTE — OCCUPATIONAL THERAPY INITIAL EVALUATION ADULT - LEVEL OF INDEPENDENCE: DRESS UPPER BODY, OT EVAL
Transport arrived to  pt and transfer to [address] [place] [address]. Unable to be assessed at this time. TBA when appropriate.

## 2022-06-21 NOTE — CONSULT NOTE ADULT - NS ATTEND AMEND GEN_ALL_CORE FT
Patient examined on 6/21 for vertigo.  Patient's exam most compatible with peripheral vertigo as positive head impulse test.   However due to continued difficulty ambulating MRI brain is recommended to exclude a posterior circulation infarct.      ***Please confirm with radiology whether additional imaging of orbit is needed to clear for MRI given history of nail in orbit at age 17 ***    May continue meclizine.  If symptoms not significantly improved may give medrol dose pack.  Until MRI resulted please load with aspirin 325 mg x 1 then 81 mg/day and give atorvastatin 80 mg/day.

## 2022-06-21 NOTE — OCCUPATIONAL THERAPY INITIAL EVALUATION ADULT - LIGHT TOUCH SENSATION, RUE, REHAB EVAL
Pt picked up for transport to [address] [place] [address], unable to be assessed at this time. TBA when appropriate.

## 2022-06-21 NOTE — CONSULT NOTE ADULT - SUBJECTIVE AND OBJECTIVE BOX
HPI:  pt reports sudden onset of dizziness this am while watching, TV.  Reports + nausea, no vomiting. + sensitivity to light, no ringing in the ears, but states his auditory acuity has been decreased x 1 week. + reproducable with head position ,no  hx  of fall  or  gd ,ptn seen at  bed  side  and  xam c.o  dizziness but  is  less than  yesterday    PTN  REFERRED TO ACUTE  REHAB  FOR  EVAL AND  TX   PAST MEDICAL & SURGICAL HISTORY:  Benign prostatic hyperplasia, unspecified whether lower urinary tract symptoms present      HTN (hypertension)      Hypercholesteremia      GERD (gastroesophageal reflux disease)      Left eye injury, sequela  had nail in eye 17 yrs old-- caused some pupil and caused increased pressure      History of surgery  deviaed septum surgery  endoscopy  coloonscopy          Hospital Course:    TODAY'S SUBJECTIVE & REVIEW OF SYMPTOMS:     Constitutional WNL   Cardio WNL   Resp WNL   GI WNL  Heme WNL  Endo WNL  Skin WNL  MSK WNL  Neuro WNL  Cognitive WNL  Psych WNL      MEDICATIONS  (STANDING):  atorvastatin 80 milliGRAM(s) Oral at bedtime  dorzolamide 2% Ophthalmic Solution 1 Drop(s) Both EYES <User Schedule>  pantoprazole    Tablet 40 milliGRAM(s) Oral before breakfast  timolol 0.5% Solution 1 Drop(s) Both EYES two times a day    MEDICATIONS  (PRN):      FAMILY HISTORY:  No pertinent family history in first degree relatives        Allergies    Pseudoephedrine Sinus (Rash)    Intolerances        SOCIAL HISTORY:    [  ] Etoh  [  ] Smoking  [  ] Substance abuse     Home Environment:  [ x ] Home Alone has  GF  [  ] Lives with Family  [  ] Home Health Aid    Dwelling:  [  ] Apartment  [ x ] Private House  [  ] Adult Home  [  ] Skilled Nursing Facility      [  ] Short Term  [  ] Long Term  [x  ] Stairs       Elevator [  ]    FUNCTIONAL STATUS PTA: (Check all that apply)  Ambulation: [  x ]Independent    [  ] Dependent     [  ] Non-Ambulatory  Assistive Device: [  ] SA Cane  [  ]  Q Cane  [  ] Walker  [  ]  Wheelchair  ADL : [x  ] Independent  [  ]  Dependent       Vital Signs Last 24 Hrs  T(C): 36.7 (21 Jun 2022 00:28), Max: 36.7 (21 Jun 2022 00:28)  T(F): 98 (21 Jun 2022 00:28), Max: 98 (21 Jun 2022 00:28)  HR: 55 (21 Jun 2022 00:28) (47 - 62)  BP: 136/59 (21 Jun 2022 00:28) (128/76 - 208/98)  BP(mean): --  RR: 18 (21 Jun 2022 00:28) (18 - 20)  SpO2: 97% (21 Jun 2022 00:28) (97% - 100%)      PHYSICAL EXAM: Alert & Oriented X3  GENERAL: NAD, well-groomed, well-developed  HEAD:  Atraumatic, Normocephalic  EYES: EOMI, PERRLA, conjunctiva and sclera clear  NECK: Supple, No JVD, Normal thyroid  CHEST/LUNG: Clear to percussion bilaterally; No rales, rhonchi, wheezing, or rubs  HEART: Regular rate and rhythm; No murmurs, rubs, or gallops  ABDOMEN: Soft, Nontender, Nondistended; Bowel sounds present  EXTREMITIES:  2+ Peripheral Pulses, No clubbing, cyanosis, or edema    NERVOUS SYSTEM:  Cranial Nerves 2-12 intact [ x ] Abnormal  [  ]  ROM: WFL all extremities [ x ]  Abnormal [  ]  Motor Strength: WFL all extremities  [ x ]  Abnormal [  ]  Sensation: intact to light touch [x  ] Abnormal [  ]  Reflexes: Symmetric [  x]  Abnormal [  ]    FUNCTIONAL STATUS:  Bed Mobility: Independent [  ]  Supervision [  ]  Needs Assistance [  ]  N/A [x  ]  Transfers: Independent [  ]  Supervision [  ]  Needs Assistance [  ]  N/A [x]   Ambulation: Independent [  ]  Supervision [  ]  Needs Assistance [  ]  N/A [  x]  ADL: Independent [  ] Requires Assistance [  ] N/A [ x ]  SEE PT/OT IE NOTES    LABS:                        14.6   10.75 )-----------( 231      ( 21 Jun 2022 07:23 )             44.3     06-21    142  |  104  |  24<H>  ----------------------------<  106<H>  4.6   |  27  |  1.4    Ca    9.9      21 Jun 2022 07:23    TPro  7.3  /  Alb  5.0  /  TBili  0.4  /  DBili  <0.2  /  AST  21  /  ALT  28  /  AlkPhos  39  06-21    PT/INR - ( 20 Jun 2022 15:24 )   PT: 11.60 sec;   INR: 1.01 ratio         PTT - ( 21 Jun 2022 07:23 )  PTT:35.0 sec      RADIOLOGY & ADDITIONAL STUDIES:< from: CT Brain Stroke Protocol (06.20.22 @ 14:48) >  IMPRESSION:  No CT evidence of large acuteterritorial infarct.    No evidence of acute intracranial pathology. No mass effect, midline   shift or intracranial hemorrhage.    < end of copied text >      Assesment:

## 2022-06-21 NOTE — OCCUPATIONAL THERAPY INITIAL EVALUATION ADULT - LEVEL OF INDEPENDENCE: GROOMING, OT EVAL
Transport arrived to  pt and transfer to [address] [place] [address]. Unable to be assessed at this time. TBA when appropriate.

## 2022-06-21 NOTE — CONSULT NOTE ADULT - ASSESSMENT
IMPRESSION: Rehab of 66 y.o m  rehab  for  BPV dizziness     PRECAUTIONS: [  ] Cardiac  [  ] Respiratory  [  ] Seizures [  ] Contact Isolation  [  ] Droplet Isolation  [ FALL ] Other    Weight Bearing Status:     RECOMMENDATION:    Out of Bed to Chair     DVT/Decubiti Prophylaxis    REHAB PLAN:     [ xx  ] Bedside P/T 3-5 times a week   [   ]   Bedside O/T  2-3 times a week             [   ] No Rehab Therapy Indicated                   [   ]  Speech Therapy   Conditioning/ROM                                    ADL  Bed Mobility                                               Conditioning/ROM  Transfers                                                     Bed Mobility  Sitting /Standing Balance                         Transfers                                        Gait Training                                               Sitting/Standing Balance  Stair Training [   ]Applicable                    Home equipment Eval                                                                        Splinting  [   ] Only      GOALS:   ADL   [ x  ]   Independent                    Transfers  [  x ] Independent                          Ambulation  [  x ] Independent     [x    ] With device                            [x   ]  CG                                                         [ x  ]  CG                                                                  [x   ] CG                            [    ] Min A                                                   [   ] Min A                                                              [   ] Min  A          DISCHARGE PLAN:   [   ]  Good candidate for Intensive Rehabilitation/Hospital based-4A [facility]                                             Will tolerate 3hrs Intensive Rehab Daily                                       [    ]  Short Term Rehab in Skilled Nursing Facility                                       [ xx   ]  Home with Outpatient or VN services neurology  fu  MRI brain is  P                                         [    ]  Possible Candidate for Intensive Hospital based Rehab

## 2022-06-21 NOTE — OCCUPATIONAL THERAPY INITIAL EVALUATION ADULT - LEVEL OF INDEPENDENCE: CHAIR TO BED, REHAB EVAL
Pt received and left in supported long sitting. As per CRICKET Kramer, pt waiting for transport to [address] [place] [address], no OOB at this time. TBA when appropriate.

## 2022-06-22 ENCOUNTER — TRANSCRIPTION ENCOUNTER (OUTPATIENT)
Age: 67
End: 2022-06-22

## 2022-06-22 PROCEDURE — 70551 MRI BRAIN STEM W/O DYE: CPT | Mod: 26

## 2022-06-22 PROCEDURE — 99238 HOSP IP/OBS DSCHRG MGMT 30/<: CPT

## 2022-06-22 RX ORDER — HYDRALAZINE HCL 50 MG
10 TABLET ORAL ONCE
Refills: 0 | Status: COMPLETED | OUTPATIENT
Start: 2022-06-22 | End: 2022-06-22

## 2022-06-22 RX ORDER — DORZOLAMIDE HYDROCHLORIDE 20 MG/ML
1 SOLUTION/ DROPS OPHTHALMIC
Qty: 0 | Refills: 0 | DISCHARGE
Start: 2022-06-22

## 2022-06-22 RX ORDER — MECLIZINE HCL 12.5 MG
1 TABLET ORAL
Qty: 30 | Refills: 0
Start: 2022-06-22 | End: 2022-07-01

## 2022-06-22 RX ORDER — ASPIRIN/CALCIUM CARB/MAGNESIUM 324 MG
1 TABLET ORAL
Qty: 30 | Refills: 0
Start: 2022-06-22 | End: 2022-07-21

## 2022-06-22 RX ORDER — TIMOLOL 0.5 %
1 DROPS OPHTHALMIC (EYE)
Qty: 0 | Refills: 0 | DISCHARGE
Start: 2022-06-22

## 2022-06-22 RX ADMIN — Medication 10 MILLIGRAM(S): at 12:00

## 2022-06-22 RX ADMIN — Medication 1 MILLIGRAM(S): at 12:00

## 2022-06-22 RX ADMIN — Medication 1 DROP(S): at 06:14

## 2022-06-22 RX ADMIN — DORZOLAMIDE HYDROCHLORIDE 1 DROP(S): 20 SOLUTION/ DROPS OPHTHALMIC at 10:09

## 2022-06-22 RX ADMIN — Medication 25 MILLIGRAM(S): at 21:05

## 2022-06-22 RX ADMIN — Medication 1 DROP(S): at 17:42

## 2022-06-22 RX ADMIN — ATORVASTATIN CALCIUM 80 MILLIGRAM(S): 80 TABLET, FILM COATED ORAL at 21:05

## 2022-06-22 RX ADMIN — Medication 25 MILLIGRAM(S): at 06:14

## 2022-06-22 RX ADMIN — PANTOPRAZOLE SODIUM 40 MILLIGRAM(S): 20 TABLET, DELAYED RELEASE ORAL at 06:14

## 2022-06-22 RX ADMIN — ENOXAPARIN SODIUM 40 MILLIGRAM(S): 100 INJECTION SUBCUTANEOUS at 15:04

## 2022-06-22 RX ADMIN — Medication 25 MILLIGRAM(S): at 15:04

## 2022-06-22 RX ADMIN — Medication 81 MILLIGRAM(S): at 15:04

## 2022-06-22 RX ADMIN — DORZOLAMIDE HYDROCHLORIDE 1 DROP(S): 20 SOLUTION/ DROPS OPHTHALMIC at 17:42

## 2022-06-22 NOTE — DISCHARGE NOTE PROVIDER - PROVIDER TOKENS
FREE:[LAST:[[facility]],PHONE:[(   )    -],FAX:[(   )    -],ADDRESS:[phone]  or  PT  [phone]    Call one of the above numbers for appointment]] FREE:[LAST:[[facility]],PHONE:[(   )    -],FAX:[(   )    -],ADDRESS:[phone]  or  PT  [phone]    Call one of the above numbers for appointment],FOLLOWUP:[1 week]]

## 2022-06-22 NOTE — DISCHARGE NOTE PROVIDER - HOSPITAL COURSE
this is a 66 year old male who presented with peripheral vertigo   -improving  -mri brain done and negative for cva  -cta head and neck and showed no acute or significant vascular abnormalities ( incidental 40 % stenosis left ICA )  -asa and statin for left ICA 40 % stenosis  -meclizine prn dizziness  -may benefit from short course of steroid - may help his dizziness  -he is stable to dc. consider outpatient pt    this is a 66 year old male who presented with peripheral vertigo   -improving  -mri brain done and negative for cva  -cta head and neck and showed no acute or significant vascular abnormalities ( incidental 40 % stenosis left ICA )  -asa and statin for left ICA 40 % stenosis  -meclizine prn dizziness  -may benefit from short course of steroid - may help his dizziness  -he is stable to dc. consider outpatient pt [facility]: call number on prescription to make appointment   this is a 66 year old male who presented with peripheral vertigo   -improving  -mri brain done and negative for cva  -cta head and neck and showed no acute or significant vascular abnormalities ( incidental 40 % stenosis left ICA )  -asa and statin for left ICA 40 % stenosis  -meclizine prn dizziness  -may benefit from short course of steroid - may help his dizziness - send off prednisone  -he is stable to dc. consider outpatient pt [facility]: call number on prescription to make appointment  -prior to dc pt reported he thinks his gout is acting up on his right ankle. examined. very mild tenderness noted over lateral rt malleoulus, no remarkable swelling  given one dose of solumedrol 40 mg iv in house, sent already prednsione which should help.   -seen pt with pt, pt ambulated with walker today.  more stable than yesterday and steady with walker.

## 2022-06-22 NOTE — DISCHARGE NOTE PROVIDER - ATTENDING DISCHARGE PHYSICAL EXAMINATION:
pt is awake, alert, oriented x 3  NAD  ATNC  rrr s1s2  no labored breathing, no use of accessory muscles  soft abdomen non distended  Neuro: awake, alert, oriented x 3, no focal deficits noted  Psych: normal mood and affect pt is awake, alert, oriented x 3  NAD  ATNC  rrr s1s2  no labored breathing, clear bilateral lungs   soft abdomen non distended  Neuro: awake, alert, oriented x 3, no focal deficits noted  Psych: normal mood and affect

## 2022-06-22 NOTE — DISCHARGE NOTE PROVIDER - CARE PROVIDER_API CALL
[facility],   [phone]  or  PT  [phone]    Call one of the above numbers for appointment  Phone: (   )    -  Fax: (   )    -  Follow Up Time:    [facility],   [phone]  or  PT  [phone]    Call one of the above numbers for appointment  Phone: (   )    -  Fax: (   )    -  Follow Up Time: 1 week

## 2022-06-22 NOTE — PHYSICAL THERAPY INITIAL EVALUATION ADULT - GENERAL OBSERVATIONS, REHAB EVAL
15;00-15;30 pt was seen for PT for IE at bed side, pt is agreeable, chart thoroughly reviewed, RN is aware.  pt was received sitting in bed side chair, in no apparent distress, +hep lock, +call bell within reach, bed side table at reach.

## 2022-06-22 NOTE — CHART NOTE - NSCHARTNOTEFT_GEN_A_CORE
Patient at [facility]. Case d/w Dr Montiel w/ Dr Adair.
Per neuro recommendation will change neuro checks from q4h to q8h

## 2022-06-22 NOTE — PHYSICAL THERAPY INITIAL EVALUATION ADULT - ADDITIONAL COMMENTS
pt lives with his partner in a private house with 4-5 steps to enter with B railing and one level inside. Pt has a basement with 1 flight of stairs down which he is using as the office for work.  pt was independent community ambulator prior to admission without AD. During the IE pt mentioned that he was cleaning the pool and probably got some water in his ear, because since that time he has feeling of "something in the ear" and this ear is not hearing too well

## 2022-06-22 NOTE — DISCHARGE NOTE PROVIDER - NSDCMRMEDTOKEN_GEN_ALL_CORE_FT
aspirin 81 mg oral delayed release tablet: 1 tab(s) orally once a day  azelastine 137 mcg/inh (0.1%) nasal spray: 2 spray(s) nasal 2 times a day  Cialis 5 mg oral tablet: 1 tab(s) orally once a day  dorzolamide 2% ophthalmic solution: 1 drop(s) to each affected eye once a day  Keflex 500 mg oral capsule: 1 cap(s) orally 4 times a day   meclizine 25 mg oral tablet: 1 tab(s) orally 3 times a day MDD:take 3 time a day as needed for dizziness. hold for sedation  omeprazole 40 mg oral delayed release capsule: 1 cap(s) orally once a day  predniSONE 20 mg oral tablet: 1 tab(s) orally once a day   Robaxin-750 oral tablet: 1 tab(s) orally every 8 hours   simvastatin 20 mg oral tablet: 1 tab(s) orally once a day (at bedtime)  timolol maleate 0.5% ophthalmic solution: 1 drop(s) to each affected eye 2 times a day  timolol-dorzolamide 0.5%-2% preservative-free ophthalmic solution: 1 drop(s) to each affected eye 2 times a day  Vascepa 1 g oral capsule: 2 cap(s) orally 2 times a day

## 2022-06-23 ENCOUNTER — TRANSCRIPTION ENCOUNTER (OUTPATIENT)
Age: 67
End: 2022-06-23

## 2022-06-23 VITALS
DIASTOLIC BLOOD PRESSURE: 78 MMHG | TEMPERATURE: 96 F | HEART RATE: 68 BPM | RESPIRATION RATE: 18 BRPM | SYSTOLIC BLOOD PRESSURE: 175 MMHG

## 2022-06-23 PROCEDURE — 99232 SBSQ HOSP IP/OBS MODERATE 35: CPT

## 2022-06-23 RX ORDER — COLCHICINE 0.6 MG
0.6 TABLET ORAL ONCE
Refills: 0 | Status: COMPLETED | OUTPATIENT
Start: 2022-06-23 | End: 2022-06-23

## 2022-06-23 RX ORDER — IBUPROFEN 200 MG
400 TABLET ORAL EVERY 6 HOURS
Refills: 0 | Status: DISCONTINUED | OUTPATIENT
Start: 2022-06-23 | End: 2022-06-23

## 2022-06-23 RX ADMIN — Medication 400 MILLIGRAM(S): at 07:46

## 2022-06-23 RX ADMIN — Medication 400 MILLIGRAM(S): at 08:24

## 2022-06-23 RX ADMIN — PANTOPRAZOLE SODIUM 40 MILLIGRAM(S): 20 TABLET, DELAYED RELEASE ORAL at 06:19

## 2022-06-23 RX ADMIN — Medication 25 MILLIGRAM(S): at 13:20

## 2022-06-23 RX ADMIN — Medication 1 DROP(S): at 06:19

## 2022-06-23 RX ADMIN — Medication 0.6 MILLIGRAM(S): at 01:57

## 2022-06-23 RX ADMIN — ENOXAPARIN SODIUM 40 MILLIGRAM(S): 100 INJECTION SUBCUTANEOUS at 13:19

## 2022-06-23 RX ADMIN — Medication 40 MILLIGRAM(S): at 11:23

## 2022-06-23 RX ADMIN — Medication 400 MILLIGRAM(S): at 01:49

## 2022-06-23 RX ADMIN — Medication 400 MILLIGRAM(S): at 02:00

## 2022-06-23 RX ADMIN — Medication 25 MILLIGRAM(S): at 06:19

## 2022-06-23 RX ADMIN — Medication 81 MILLIGRAM(S): at 11:23

## 2022-06-23 RX ADMIN — DORZOLAMIDE HYDROCHLORIDE 1 DROP(S): 20 SOLUTION/ DROPS OPHTHALMIC at 08:58

## 2022-06-23 NOTE — DISCHARGE NOTE NURSING/CASE MANAGEMENT/SOCIAL WORK - NSDCPEFALRISK_GEN_ALL_CORE
For information on Fall & Injury Prevention, visit: [email or web address] OR  [email or web address] OR  https://www.cdc.gov/steadi/patient.html

## 2022-06-23 NOTE — DISCHARGE NOTE NURSING/CASE MANAGEMENT/SOCIAL WORK - PATIENT PORTAL LINK FT
You can access the FollowMyHealth Patient Portal offered by [facility] by registering at the following website: [email or web address]. By joining [facility]’s FollowMyHealth portal, you will also be able to view your health information using other applications (apps) compatible with our system.

## 2022-06-28 DIAGNOSIS — Z88.6 ALLERGY STATUS TO ANALGESIC AGENT: ICD-10-CM

## 2022-06-28 DIAGNOSIS — I65.22 OCCLUSION AND STENOSIS OF LEFT CAROTID ARTERY: ICD-10-CM

## 2022-06-28 DIAGNOSIS — R42 DIZZINESS AND GIDDINESS: ICD-10-CM

## 2022-06-28 DIAGNOSIS — G47.33 OBSTRUCTIVE SLEEP APNEA (ADULT) (PEDIATRIC): ICD-10-CM

## 2022-06-28 DIAGNOSIS — M10.9 GOUT, UNSPECIFIED: ICD-10-CM

## 2022-06-28 DIAGNOSIS — N40.0 BENIGN PROSTATIC HYPERPLASIA WITHOUT LOWER URINARY TRACT SYMPTOMS: ICD-10-CM
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
## 2022-06-28 DIAGNOSIS — R29.700 NIHSS SCORE 0: ICD-10-CM

## 2022-06-28 DIAGNOSIS — I10 ESSENTIAL (PRIMARY) HYPERTENSION: ICD-10-CM

## 2022-06-28 DIAGNOSIS — K21.9 GASTRO-ESOPHAGEAL REFLUX DISEASE WITHOUT ESOPHAGITIS: ICD-10-CM

## 2022-06-28 DIAGNOSIS — E78.5 HYPERLIPIDEMIA, UNSPECIFIED: ICD-10-CM

## 2022-06-28 DIAGNOSIS — Z79.899 OTHER LONG TERM (CURRENT) DRUG THERAPY: ICD-10-CM

## 2022-06-28 DIAGNOSIS — H81.10 BENIGN PAROXYSMAL VERTIGO, UNSPECIFIED EAR: ICD-10-CM

## 2022-06-28 DIAGNOSIS — E78.1 PURE HYPERGLYCERIDEMIA: ICD-10-CM

## 2022-07-25 NOTE — PHYSICAL THERAPY INITIAL EVALUATION ADULT - SITTING BALANCE: DYNAMIC
----- Message from Deloris Longoria PA-C sent at 7/25/2022  6:57 AM CDT -----  Patient is notably iron deficient. Likely cause of anemia. How much is she currently supplementing? Would still like repeat CBC, and UA[place]BT this [place] as previously advised[place] [facility] [place]

## 2023-01-20 NOTE — ED ADULT TRIAGE NOTE - CHIEF COMPLAINT QUOTE
As per patient, "I got the vaccine on 3/10 and I've had joint pains since. Both my knees and fingers and elbows hurt, worse in the last 2 days. I have gout and I took colchicine this morning and I took 5-6 ibuprofen an hour ago"
normal

## 2023-09-12 ENCOUNTER — OUTPATIENT (OUTPATIENT)
Dept: OUTPATIENT SERVICES | Facility: HOSPITAL | Age: 68
LOS: 1 days | End: 2023-09-12
Payer: MEDICARE

## 2023-09-12 DIAGNOSIS — Z00.8 ENCOUNTER FOR OTHER GENERAL EXAMINATION: ICD-10-CM

## 2023-09-12 DIAGNOSIS — Z98.890 OTHER SPECIFIED POSTPROCEDURAL STATES: Chronic | ICD-10-CM

## 2023-09-12 DIAGNOSIS — F52.21 MALE ERECTILE DISORDER: ICD-10-CM

## 2023-09-12 PROCEDURE — 93980 PENILE VASCULAR STUDY: CPT

## 2023-09-12 PROCEDURE — 93980 PENILE VASCULAR STUDY: CPT | Mod: 26

## 2023-09-13 DIAGNOSIS — F52.21 MALE ERECTILE DISORDER: ICD-10-CM

## 2024-05-10 NOTE — ASU PREOP CHECKLIST - PATIENT'S PERSONAL PROPERTY GIVEN TO
Subjective:     Patient ID:Tomas Cisneros is a 61 y.o. male.    Back Pain  This is a recurrent problem. The current episode started more than 1 year ago. The problem occurs constantly. The pain is present in the lumbar spine. The quality of the pain is described as aching. The pain is severe. The pain is The same all the time. The symptoms are aggravated by bending and position. Stiffness is present All day. Pertinent negatives include no abdominal pain, bladder incontinence, bowel incontinence, chest pain, dysuria, fever, headaches, leg pain, numbness, paresis, paresthesias, pelvic pain, perianal numbness, tingling, weakness or weight loss. Risk factors include sedentary lifestyle. He has tried analgesics (steroids) for the symptoms. The treatment provided moderate (but keeps recurring) relief.       No Known Allergies    Current Outpatient Medications   Medication Sig Dispense Refill    traMADol (ULTRAM) 50 MG tablet Take 1 tablet by mouth every 8 hours as needed for Pain for up to 30 days. Max Daily Amount: 150 mg 90 tablet 0    scopolamine (TRANSDERM-SCOP) transdermal patch Place 1 patch onto the skin every 72 hours 5 patch 0    predniSONE (DELTASONE) 20 MG tablet Take 2 tablets by mouth daily for 5 days -with breakfast 10 tablet 0    baclofen (LIORESAL) 10 MG tablet TAKE ONE TABLET BY MOUTH THREE TIMES A DAY 40 tablet 2    dexlansoprazole (DEXILANT) 60 MG CPDR delayed release capsule TAKE 1 CAPSULE BY MOUTH EVERY DAY 90 capsule 3    acetaminophen (TYLENOL) 500 MG tablet Take 1 tablet by mouth every 6 hours as needed for Pain 56 tablet 0    buPROPion (WELLBUTRIN SR) 150 MG extended release tablet TAKE 1 TABLET BY MOUTH 2 TIMES A  tablet 3    triamcinolone (KENALOG) 0.1 % cream Apply to affected areas on the skin twice daily for up to 2 weeks or until improved. (Patient taking differently: 2 times daily as needed Apply to affected areas on the skin twice daily for up to 2 weeks or until improved.) 80 g 1    
on unit

## 2024-09-20 NOTE — PATIENT PROFILE ADULT - DO YOU FEEL THREATENED BY OTHERS?
Triaged with Dr. Fisher who recommends PHP. Writer met with patient and discussed recommendations. Patient declined PHP. Patient stated, \" I need somewhere to go, are they going to uber me to a shelter\". Provider notified.    no

## 2025-01-14 NOTE — DISCHARGE NOTE PROVIDER - NSDCHC_MEDRECSTATUS_GEN_ALL_CORE
Order refaxed to    Admission Reconciliation is Completed  Discharge Reconciliation is Completed Admission Reconciliation is Completed  Discharge Reconciliation is Not Complete No

## 2025-03-19 ENCOUNTER — OUTPATIENT (OUTPATIENT)
Dept: OUTPATIENT SERVICES | Facility: HOSPITAL | Age: 70
LOS: 1 days | End: 2025-03-19
Payer: MEDICARE

## 2025-03-19 DIAGNOSIS — Z98.890 OTHER SPECIFIED POSTPROCEDURAL STATES: Chronic | ICD-10-CM

## 2025-03-19 DIAGNOSIS — Z00.8 ENCOUNTER FOR OTHER GENERAL EXAMINATION: ICD-10-CM

## 2025-03-19 DIAGNOSIS — R97.20 ELEVATED PROSTATE SPECIFIC ANTIGEN [PSA]: ICD-10-CM

## 2025-03-19 PROCEDURE — 72197 MRI PELVIS W/O & W/DYE: CPT | Mod: 26

## 2025-03-19 PROCEDURE — A9579: CPT

## 2025-03-19 PROCEDURE — 72197 MRI PELVIS W/O & W/DYE: CPT

## 2025-03-20 DIAGNOSIS — R97.20 ELEVATED PROSTATE SPECIFIC ANTIGEN [PSA]: ICD-10-CM

## 2025-04-09 NOTE — DISCHARGE NOTE NURSING/CASE MANAGEMENT/SOCIAL WORK - NSDCVIVACCINE_GEN_ALL_CORE_FT
4 = No assist / stand by assistance
Tdap; 13-Mar-2020 14:24; Corry Hinojosa (CRICKET); [employer]; [id number] (Exp. Date: 19-Mar-2022); IntraMuscular; Deltoid Right.; 0.5 milliLiter(s); VIS (VIS Published: 09-May-2013, VIS Presented: 13-Mar-2020);